# Patient Record
Sex: MALE | Race: WHITE | NOT HISPANIC OR LATINO | Employment: FULL TIME | URBAN - METROPOLITAN AREA
[De-identification: names, ages, dates, MRNs, and addresses within clinical notes are randomized per-mention and may not be internally consistent; named-entity substitution may affect disease eponyms.]

---

## 2017-05-18 ENCOUNTER — TELEPHONE (OUTPATIENT)
Dept: MEDICAL GROUP | Facility: CLINIC | Age: 37
End: 2017-05-18

## 2017-07-10 ENCOUNTER — OCCUPATIONAL MEDICINE (OUTPATIENT)
Dept: URGENT CARE | Facility: PHYSICIAN GROUP | Age: 37
End: 2017-07-10
Payer: COMMERCIAL

## 2017-07-10 ENCOUNTER — HOSPITAL ENCOUNTER (OUTPATIENT)
Dept: RADIOLOGY | Facility: MEDICAL CENTER | Age: 37
End: 2017-07-10
Attending: NURSE PRACTITIONER
Payer: COMMERCIAL

## 2017-07-10 VITALS
BODY MASS INDEX: 30.8 KG/M2 | WEIGHT: 220 LBS | TEMPERATURE: 97.9 F | HEIGHT: 71 IN | SYSTOLIC BLOOD PRESSURE: 112 MMHG | DIASTOLIC BLOOD PRESSURE: 76 MMHG | HEART RATE: 50 BPM | OXYGEN SATURATION: 96 %

## 2017-07-10 DIAGNOSIS — Z23 NEED FOR TDAP VACCINATION: ICD-10-CM

## 2017-07-10 DIAGNOSIS — S61.210A LACERATION OF RIGHT INDEX FINGER: ICD-10-CM

## 2017-07-10 DIAGNOSIS — Z02.1 PRE-EMPLOYMENT DRUG SCREENING: ICD-10-CM

## 2017-07-10 LAB
AMP AMPHETAMINE: NORMAL
COC COCAINE: NORMAL
INT CON NEG: NEGATIVE
INT CON POS: POSITIVE
MET METHAMPHETAMINES: NORMAL
OPI OPIATES: NORMAL
PCP PHENCYCLIDINE: NORMAL
POC DRUG COMMENT 753798-OCCUPATIONAL HEALTH: NEGATIVE
THC: NORMAL

## 2017-07-10 PROCEDURE — 73140 X-RAY EXAM OF FINGER(S): CPT | Mod: RT

## 2017-07-10 PROCEDURE — 90471 IMMUNIZATION ADMIN: CPT | Performed by: NURSE PRACTITIONER

## 2017-07-10 PROCEDURE — 12001 RPR S/N/AX/GEN/TRNK 2.5CM/<: CPT | Mod: 29 | Performed by: NURSE PRACTITIONER

## 2017-07-10 PROCEDURE — 80305 DRUG TEST PRSMV DIR OPT OBS: CPT | Mod: 29 | Performed by: NURSE PRACTITIONER

## 2017-07-10 PROCEDURE — 90715 TDAP VACCINE 7 YRS/> IM: CPT | Performed by: NURSE PRACTITIONER

## 2017-07-10 RX ORDER — BUPROPION HYDROCHLORIDE 100 MG/1
100 TABLET ORAL 2 TIMES DAILY
COMMUNITY

## 2017-07-10 ASSESSMENT — ENCOUNTER SYMPTOMS
CARDIOVASCULAR NEGATIVE: 1
NAUSEA: 0
FALLS: 0
FOCAL WEAKNESS: 0
TINGLING: 0
SENSORY CHANGE: 0
CONSTITUTIONAL NEGATIVE: 1
NEUROLOGICAL NEGATIVE: 1
RESPIRATORY NEGATIVE: 1
GASTROINTESTINAL NEGATIVE: 1
PSYCHIATRIC NEGATIVE: 1

## 2017-07-10 NOTE — LETTER
"EMPLOYEE’S CLAIM FOR COMPENSATION/ REPORT OF INITIAL TREATMENT  FORM C-4    EMPLOYEE’S CLAIM - PROVIDE ALL INFORMATION REQUESTED   First Name  Vipul Last Name  Isaias Birthdate                    1980                Sex  male Claim Number   Home Address  [place] Age  36 y.o. Height  1.803 m (5' 11\") Weight  99.791 kg (220 lb) [facility]     [facility] Zip  [address] Telephone  [phone] (home)    Mailing Address  [place] [facility] Zip  [address] Primary Language Spoken  English    Insurer   Third Party   [employer]   Employee's Occupation (Job Title) When Injury or Occupational Disease Occurred      Employer's Name    [employer]      Employer Address   [address] [place] [address] Zip   [address]   Date of Injury  7/10/2017               Hour of Injury  7:50 AM Date Employer Notified  7/10/2017 Last Day of Work after Injury or Occupational Disease  7/10/2017 Supervisor to Whom Injury Reported  [facility]   Address or Location of Accident (if applicable)  [[address] [place] [address]]   What were you doing at the time of accident? (if applicable)  CUTTING ON THE SAW    How did this injury or occupational disease occur? (Be specific an answer in detail. Use additional sheet if necessary)  HOLDING THE STAIMLESS STEEL TUBING END   If you believe that you have an occupational disease, when did you first have knowledge of the disability and it relationship to your employment?   Witnesses to the Accident  none      Nature of Injury or Occupational Disease  Laceration  Part(s) of Body Injured or Affected  Finger (R), ,     I certify that the above is true and correct to the best of my knowledge and that I have provided this information in order to obtain the benefits of [place] inclusive or " [place]).  I hereby authorize any physician, chiropractor, surgeon, practitioner, or other person, any hospital, including [facility], any medical service organization, any insurance company, or other institution or organization to release to each other, any medical or other information, including benefits paid or payable, pertinent to this injury or disease, except information relative to diagnosis, treatment and/or counseling for AIDS, psychological conditions, alcohol or controlled substances, for which I must give specific authorization.  A Photostat of this authorization shall be as valid as the original.     Date   Place   Employee’s Signature   THIS REPORT MUST BE COMPLETED AND MAILED WITHIN 3 WORKING DAYS OF TREATMENT   Place  [facility]  Name of Facility  [place]   Date  7/10/2017 Diagnosis  (S61.210A) Laceration of right index finger  (Z23) Need for Tdap vaccination  (Z02.1) Pre-employment drug screening Is there evidence the injured employee was under the influence of alcohol and/or another controlled substance at the time of accident?   Hour   Description of Injury or Disease  Diagnoses of Laceration of right index finger, Need for Tdap vaccination, and Pre-employment drug screening were pertinent to this visit. No   Treatment  Sutures placed, TDAP given, wound care, work restrictions, RTC 7/14/17 for re-eval.   Have you advised the patient to remain off work five days or more? No   X-Ray Findings  Negative   If Yes   From Date  To Date      From information given by the employee, together with medical evidence, can you directly connect this injury or occupational disease as job incurred?  Yes If No Full Duty  No Modified Duty  Yes   Is additional medical care by a physician indicated?  Yes If Modified Duty, Specify any Limitations / Restrictions  Per D39   Do you know of any previous injury or disease contributing to this condition or  "occupational disease?                            No   Date  7/10/2017 Print Doctor’s Name ZHENG Pearson I certify the employer’s copy of  this form was mailed on:   Address  [address] Insurer’s Use Only     [facility] Zip  [address]    Provider’s Tax ID Number  [id number]  Telephone  Dept: [phone]        alejandro-JIMMY Baxter   e-Signature: Dr. Tio Vega, Medical Director Degree  APRN        ORIGINAL-TREATING PHYSICIAN OR CHIROPRACTOR    PAGE 2-INSURER/TPA    PAGE 3-EMPLOYER    PAGE 4-EMPLOYEE             Form C-4 (rev10/07)              BRIEF DESCRIPTION OF RIGHTS AND BENEFITS  (Pursuant to [place]RS 616C.050)    Notice of Injury or Occupational Disease (Incident Report Form C-1): If an injury or occupational disease (OD) arises out of and in the  course of employment, you must provide written notice to your employer as soon as practicable, but no later than 7 days after the accident or  OD. Your employer shall maintain a sufficient supply of the required forms.    Claim for Compensation (Form C-4): If medical treatment is sought, the form C-4 is available at the place of initial treatment. A completed  \"Claim for Compensation\" (Form C-4) must be filed within 90 days after an accident or OD. The treating physician or chiropractor must,  within 3 working days after treatment, complete and mail to the employer, the employer's insurer and third-party , the Claim for  Compensation.    Medical Treatment: If you require medical treatment for your on-the-job injury or OD, you may be required to select a physician or  chiropractor from a list provided by your workers’ compensation insurer, if it has contracted with an Organization for Managed Care (MCO) or  Preferred Provider Organization (PPO) or providers of health care. If your employer has not entered into a contract with an MCO or PPO, you  may select a physician or chiropractor from the Panel of Physicians and " Chiropractors. Any medical costs related to your industrial injury or  OD will be paid by your insurer.    Temporary Total Disability (TTD): If your doctor has certified that you are unable to work for a period of at least 5 consecutive days, or 5  cumulative days in a 20-day period, or places restrictions on you that your employer does not accommodate, you may be entitled to TTD  compensation.    Temporary Partial Disability (TPD): If the wage you receive upon reemployment is less than the compensation for TTD to which you are  entitled, the insurer may be required to pay you TPD compensation to make up the difference. TPD can only be paid for a maximum of 24  months.    Permanent Partial Disability (PPD): When your medical condition is stable and there is an indication of a PPD as a result of your injury or  OD, within 30 days, your insurer must arrange for an evaluation by a rating physician or chiropractor to determine the degree of your PPD. The  amount of your PPD award depends on the date of injury, the results of the PPD evaluation and your age and wage.    Permanent Total Disability (PTD): If you are medically certified by a treating physician or chiropractor as permanently and totally disabled  and have been granted a PTD status by your insurer, you are entitled to receive monthly benefits not to exceed 66 2/3% of your average  monthly wage. The amount of your PTD payments is subject to reduction if you previously received a PPD award.    Vocational Rehabilitation Services: You may be eligible for vocational rehabilitation services if you are unable to return to the job due to a  permanent physical impairment or permanent restrictions as a result of your injury or occupational disease.    Transportation and Per Elliot Reimbursement: You may be eligible for travel expenses and per elliot associated with medical treatment.    Reopening: You may be able to reopen your claim if your condition worsens after claim  closure.    Appeal Process: If you disagree with a written determination issued by the insurer or the insurer does not respond to your request, you may  appeal to the Department of Administration, , by following the instructions contained in your determination letter. You must  appeal the determination within 70 days from the date of the determination letter at [address], [address], [place], [place]  [address], or [address], [address], [place], [place] [address]. If you disagree with the  decision, you may appeal to the  Department of Administration, . You must file your appeal within 30 days from the date of the  decision  letter at [address], [address], [place], [place] [address], or [address], [place], [place] [address]. If you  disagree with a decision of an , you may file a petition for judicial review with the District Court. You must do so within 30  days of the Appeal Officer’s decision. You may be represented by an  at your own expense or you may contact the [facility] for possible  representation.    [place]  for Injured Workers (NAIW): If you disagree with a  decision, you may request that NAIW represent you  without charge at an  Hearing. For information regarding denial of benefits, you may contact the [facility] at: [address], [address], [place] [address], [phone], or [address], [place] [address], [phone]    To File a Complaint with the Division: If you wish to file a complaint with the  of the Division of Industrial Relations (DIR),  please contact the Workers’ Compensation Section, [address], [address], [place], [place] [address], telephone [phone]) [phone], or  [address], [place] [address], telephone [phone]) [phone].    For  assistance with Workers’ Compensation Issues: you may contact the Office of the Governor Consumer Health Assistance, [address] [place] [address], [place] [address]60, [place] [address], Toll Free 1-381.831.9955, Web site: http://govcha.Critical access hospital.nv., E-mail  [email or web address] access [email or web address].                                                                                                                                                                                                                                   __________________________________________________________________                                                                   _________________                Employee Name / Signature                                                                                                                                                       Date                                                                                                                                                                                                     D-2 (rev. 10/07)

## 2017-07-10 NOTE — PATIENT INSTRUCTIONS
Vipul Esparza is a 36 y.o. male here for a non-provider visit for uds    If abnormal was an in office provider notified today (if so, indicate provider)? No  Routed to PCP? No

## 2017-07-10 NOTE — PROGRESS NOTES
"Subjective:      Vipul Esparza is a 36 y.o. male who presents with Laceration      DOI 7/10/17 0750: Patient was cutting a metal pipe using a saw when the piece of pipe broke and accidentally laceration the tip of his right index finger. He immediately developed pain to the site. Pain is rated 8/10. He denies weakness, numbness, or tingling. He wrapped the finger up and came directly to urgent care. He cannot recall his last tetanus booster. He is left hand dominate. He denies previous injuries to this finger.      Laceration     DOI 7/10/17 0750: Patient was cutting a metal pipe using a saw when the piece of pipe broke and accidentally laceration the tip of his right index finger. He immediately developed pain to the site. Pain is rated 8/10. He denies weakness, numbness, or tingling. He wrapped the finger up and came directly to urgent care. He cannot recall his last tetanus booster. He is left hand dominate. He denies previous injuries to this finger.     Past Medical History   Diagnosis Date   • Diabetes      possible   • ASTHMA    • Dental disorder      broken teeth lower front   • Pain      jaw   • Psychiatric problem      anxiety   • Seizure ([facility]) 9/2010     \"during sleep\"     Past Surgical History   Procedure Laterality Date   • Mandible fracture orif  10/7/2010     Performed by [facility]ELAYA at [facility]     Current Outpatient Prescriptions on File Prior to Visit   Medication Sig Dispense Refill   • divalproex (DEPAKOTE) 500 MG TBEC Take 500 mg by mouth 3 times a day. 500mg in AM  1000mg in PM     • olanzapine (ZYPREXA) 10 MG tablet Take 10 mg by mouth every evening.     • hydroxyzine (VISTARIL) 50 MG CAPS Take 50 mg by mouth every bedtime.       No current facility-administered medications on file prior to visit.     Review of patient's allergies indicates no known allergies.      Review of Systems   Constitutional: Negative.    Respiratory: Negative.    Cardiovascular: Negative.  " "  Gastrointestinal: Negative.  Negative for nausea.   Musculoskeletal: Negative for joint pain and falls.        Digit pain   Skin: Negative for itching and rash.        Laceration to fingertip   Neurological: Negative.  Negative for tingling, sensory change and focal weakness.   Endo/Heme/Allergies: Negative.    Psychiatric/Behavioral: Negative.           Objective:     /76 mmHg  Pulse 50  Temp(Src) 36.6 °C (97.9 °F)  Ht 1.803 m (5' 11\")  Wt 99.791 kg (220 lb)  BMI 30.70 kg/m2  SpO2 96%     Physical Exam   Constitutional: He is oriented to person, place, and time. Vital signs are normal. He appears well-developed and well-nourished. He does not appear ill. No distress.   HENT:   Head: Normocephalic and atraumatic.   Right Ear: External ear normal.   Left Ear: External ear normal.   Nose: Nose normal.   Mouth/Throat: Oropharynx is clear and moist.   Eyes: Conjunctivae are normal. Pupils are equal, round, and reactive to light. Right eye exhibits no discharge. Left eye exhibits no discharge.   Neck: Normal range of motion. Neck supple.   Cardiovascular: Normal rate, regular rhythm, normal heart sounds and intact distal pulses.    Pulmonary/Chest: Effort normal and breath sounds normal. No respiratory distress.   Musculoskeletal: Normal range of motion. He exhibits tenderness. He exhibits no edema.        Right hand: He exhibits tenderness and laceration. He exhibits normal range of motion, no bony tenderness, normal two-point discrimination, normal capillary refill, no deformity and no swelling. Normal sensation noted. Normal strength noted.        Hands:  Lymphadenopathy:     He has no cervical adenopathy.   Neurological: He is alert and oriented to person, place, and time. He has normal strength. No cranial nerve deficit or sensory deficit. He exhibits normal muscle tone. Coordination and gait normal.   Skin: Skin is warm and dry. Laceration noted. No abrasion, no bruising, no burn, no ecchymosis and no " "rash noted. He is not diaphoretic. No cyanosis or erythema. No pallor. Nails show no clubbing.   There is a linear, horizontal 1.5cm laceration to distal tip of right index finger, palmar aspect, no nailbed involvement. No foreign bodies identified. N/V intact. Cap refill brisk. Full ROM.    Psychiatric: He has a normal mood and affect. His behavior is normal. Judgment and thought content normal.   Vitals reviewed.         Assessment/Plan:     1. Laceration of right index finger  DX-FINGER(S) 2+ RIGHT    Tdap =>6yo IM   2. Need for Tdap vaccination  Tdap =>6yo IM   3. Pre-employment drug screening  POCT 6 Panel Urine Drug Screen       DX finger reviewed by myself, radiology reading \"No evidence for fracture or dislocation. Laceration injury to soft tissues of the distal portion of the right index finger.\"        Procedure: Laceration Repair  -Risks including bleeding, nerve damage, infection, and poor cosmetic outcome discussed at length. Benefits and alternatives discussed.   -Sterile technique throughout  -Digital block and minimal local anesthesia with 2% lidocaine  -Closed with #5  4-0 Nylon interrupted sutures with good wound approximation  -Dressing placed  -Patient tolerated well      TDAP given, wound care discussed, work restrictions, RTC 7/14/17 for re-eval.   Supportive care, differential diagnoses, and indications for immediate follow-up discussed with patient.   Pathogenesis of diagnosis discussed including typical length and natural progression.   Instructed to return to clinic or nearest emergency department sooner for any change in condition, further concerns, or worsening of symptoms.  Patient states understanding of the plan of care and discharge instructions.          ELLA Pearson.        "

## 2017-07-10 NOTE — LETTER
"   [facility]   [address] [place] [address]  Phone: [phone] - Fax: [phone]        [facility] Progress Report and Disability Certification  Date of Service: 7/10/2017   No Show:  No  Date / Time of Next Visit: 7/14/2017   Claim Information   Patient Name: Vipul Esparza  Claim Number:     Employer:   [employer] Date of Injury: 7/10/2017     Insurer / TPA: [employer]ia [employer]  ID / SSN:     Occupation:   Diagnosis: Diagnoses of Laceration of right index finger, Need for Tdap vaccination, and Pre-employment drug screening were pertinent to this visit.    Medical Information   Related to Industrial Injury? Yes    Subjective Complaints:  DOI 7/10/17 0750: Patient was cutting a metal pipe using a saw when the piece of pipe broke and accidentally laceration the tip of his right index finger. He immediately developed pain to the site. Pain is rated 8/10. He denies weakness, numbness, or tingling. He wrapped the finger up and came directly to urgent care. He cannot recall his last tetanus booster. He is left hand dominate. He denies previous injuries to this finger.    Objective Findings: A/Ox4. NAD. There is a linear, horizontal 1.5cm laceration to distal tip of right index finger, palmar aspect, no nailbed involvement. No foreign bodies identified. N/V intact. Cap refill brisk. Full ROM.    Pre-Existing Condition(s): Denies    Assessment:   Initial Visit    Status: Additional Care Required  Permanent Disability:No    Plan:   Comments:Sutures placed, TDAP given, wound care, work restrictions, RTC 7/14/17 for re-eval.     Diagnostics: X-ray    Comments:  DX finger radiology reading \"No evidence for fracture or dislocation. Laceration injury to soft tissues of the distal portion of the right index finger.\"        Disability Information   Status: Released to Restricted Duty    From:  7/10/2017  Through: 7/14/2017 Restrictions are: Temporary   "   Physical Restrictions   Sitting:    Standing:    Stooping:    Bending:      Squatting:    Walking:    Climbing:    Pushing:      Pulling:    Other:    Reaching Above Shoulder (L):   Reaching Above Shoulder (R):       Reaching Below Shoulder (L):    Reaching Below Shoulder (R):      Not to exceed Weight Limits   Carrying(hrs):   Weight Limit(lb):   Lifting(hrs):   Weight  Limit(lb):     Comments: Must keep finger bandaged while at work. Instructed to refrain from submerging finger into water or liquid.     Repetitive Actions   Hands: i.e. Fine Manipulations from Grasping: < or = to 1 hr/day  Comments:right hand   Feet: i.e. Operating Foot Controls:     Driving / Operate Machinery:     Physician Name: ZHENG Pearson Physician Signature: JIMMY Suarez e-Signature: Dr. Tio Vega, Medical Director   Clinic Name / Location: [address] [address] Clinic Phone Number: Dept: [phone]   Appointment Time: 8:15 Am Visit Start Time:    Check-In Time:  8:30 Am Visit Discharge Time:     Original-Treating Physician or Chiropractor    Page 2-Insurer/TPA    Page 3-Employer    Page 4-Employee

## 2017-07-14 ENCOUNTER — OCCUPATIONAL MEDICINE (OUTPATIENT)
Dept: URGENT CARE | Facility: PHYSICIAN GROUP | Age: 37
End: 2017-07-14
Payer: COMMERCIAL

## 2017-07-14 VITALS
OXYGEN SATURATION: 95 % | SYSTOLIC BLOOD PRESSURE: 118 MMHG | DIASTOLIC BLOOD PRESSURE: 64 MMHG | BODY MASS INDEX: 30.14 KG/M2 | HEART RATE: 60 BPM | TEMPERATURE: 97.2 F | WEIGHT: 216 LBS

## 2017-07-14 DIAGNOSIS — S61.218D: ICD-10-CM

## 2017-07-14 PROCEDURE — 99024 POSTOP FOLLOW-UP VISIT: CPT | Performed by: PHYSICIAN ASSISTANT

## 2017-07-14 ASSESSMENT — ENCOUNTER SYMPTOMS
FOCAL WEAKNESS: 0
BRUISES/BLEEDS EASILY: 0
ROS SKIN COMMENTS: SEE HPI
CONSTITUTIONAL NEGATIVE: 1
TINGLING: 0

## 2017-07-14 NOTE — MR AVS SNAPSHOT
Vipul Esparza   2017 3:30 PM   Occupational Medicine   MRN: [id number]    Department:  [facility]   Dept Phone:  [phone]    Description:  Male : 1980   Provider:  Taryn Cortes PA-C           Reason for Visit     Follow-Up fv sutures      Allergies as of 2017     No Known Allergies      You were diagnosed with     Laceration of index finger, subsequent encounter   [[id number]]         Vital Signs     Blood Pressure Pulse Temperature Weight Oxygen Saturation       118/64 mmHg 60 36.2 °C (97.2 °F) 97.977 kg (216 lb) 95%       Basic Information     Date Of Birth Sex Race Ethnicity Preferred Language    1980 Male White Non- English      Your appointments     2017  3:30 PM   Urgent/Same Day with [facility]   [facility] [place]    [address] [place] [id number]   [phone]           You will be receiving a confirmation call a few days before your appointment from our automated call confirmation system.              Health Maintenance        Date Due Completion Dates    IMM INFLUENZA (1) 2017 ---    IMM DTaP/Tdap/Td Vaccine (2 - Td) 7/10/2027 7/10/2017            Current Immunizations     Tdap Vaccine 7/10/2017      Below and/or attached are the medications your provider expects you to take. Review all of your home medications and newly ordered medications with your provider and/or pharmacist. Follow medication instructions as directed by your provider and/or pharmacist. Please keep your medication list with you and share with your provider. Update the information when medications are discontinued, doses are changed, or new medications (including over-the-counter products) are added; and carry medication information at all times in the event of emergency situations     Allergies:  No Known Allergies          Medications  Valid as of: 2017 -  3:33 PM    Generic Name Brand Name Tablet Size Instructions for use    BuPROPion HCl  (Tab) WELLBUTRIN 100 MG Take 100 mg by mouth 2 times a day.        Divalproex Sodium (Tablet Delayed Response) DEPAKOTE 500 MG Take 500 mg by mouth 3 times a day. 500mg in AM  1000mg in PM        HydrOXYzine Pamoate (Cap) VISTARIL 50 MG Take 50 mg by mouth every bedtime.        OLANZapine (Tab) ZYPREXA 10 MG Take 10 mg by mouth every evening.        .                 Medicines prescribed today were sent to:     [facility] [address] [place] [address] - [address] [place] [address]    Phone: [phone] Fax: [phone]    Open 24 Hours?: No      Medication refill instructions:       If your prescription bottle indicates you have medication refills left, it is not necessary to call your provider’s office. Please contact your pharmacy and they will refill your medication.    If your prescription bottle indicates you do not have any refills left, you may request refills at any time through one of the following ways: The online [employer] system (except Urgent Care), by calling your provider’s office, or by asking your pharmacy to contact your provider’s office with a refill request. Medication refills are processed only during regular business hours and may not be available until the next business day. Your provider may request additional information or to have a follow-up visit with you prior to refilling your medication.   *Please Note: Medication refills are assigned a new Rx number when refilled electronically. Your pharmacy may indicate that no refills were authorized even though a new prescription for the same medication is available at the pharmacy. Please request the medicine by name with the pharmacy before contacting your provider for a refill.           MyChart Status: Patient Declined        Quit Tobacco Information     Do you want to quit using tobacco?    Quitting tobacco decreases risks of cancer, heart and lung disease, increases life expectancy, improves sense of taste and smell, and increases  spending money, among other benefits.    If you are thinking about quitting, we can help.  • [facility] Quit Tobacco Program: 339.547.7901  o Program occurs weekly for four weeks and includes pharmacist consultation on products to support quitting smoking or chewing tobacco. A provider referral is needed for pharmacist consultation.  • Tobacco Users Help Hotline: 2-180-QUIT-NOW (205-7714) or https://[place].quitlogix.org/  o Free, confidential telephone and online coaching for [place] residents. Sessions are designed on a schedule that is convenient for you. Eligible clients receive free nicotine replacement therapy.  • Nationally: www.smokefree.gov  o Information and professional assistance to support both immediate and long-term needs as you become, and remain, a non-smoker. Smokefree.gov allows you to choose the help that best fits your needs.

## 2017-07-14 NOTE — LETTER
[facility] [place] [address]  Phone: [phone] - Fax: [phone]        [facility] Progress Report and Disability Certification  Date of Service: 7/14/2017   No Show:  No  Date / Time of Next Visit: 7/20/2017   Claim Information   Patient Name: Vipul Esparza  Claim Number:     Employer:   [employer] Date of Injury: 7/10/2017     Insurer / TPA: [employer]  ID / SSN:     Occupation:   Diagnosis: The encounter diagnosis was Laceration of index finger, subsequent encounter.    Medical Information   Related to Industrial Injury? Yes    Subjective Complaints:  DOI 7/10/17 0750: Patient was cutting a metal pipe using a saw when the piece of pipe broke and accidentally laceration the tip of his right index finger.  Repaired in clinic.  Had quite a bit of pain that day but has since improved.   He denies any pain today.  He states that he feels he has normal sensation. No tingling.  No difficulty with ROM.  No redness, swelling or discharge.       Objective Findings: Healing 1.5cm laceration to distal tip of right index finger, palmar aspect. N/V intact. Cap refill brisk. Full ROM.   Deep layers appear to be closed.     Pre-Existing Condition(s): None    Assessment:   Condition Improved    Status: Additional Care Required  Permanent Disability:No    Plan:      Diagnostics: X-ray  Comments:NEG 07/10/17    Comments:       Disability Information   Status: Released to Full Duty    From:  7/14/2017  Through: 7/20/2017 Restrictions are:     Physical Restrictions   Sitting:    Standing:    Stooping:    Bending:      Squatting:    Walking:    Climbing:    Pushing:      Pulling:    Other:    Reaching Above Shoulder (L):   Reaching Above Shoulder (R):       Reaching Below Shoulder (L):    Reaching Below Shoulder (R):      Not to exceed Weight Limits   Carrying(hrs):   Weight Limit(lb):   Lifting(hrs):   Weight  Limit(lb):     Comments: Healing  well.  No signs or symptoms of infection  Full duty however keep covered at work.   RTC next week for suture removal and likely MMI    Repetitive Actions   Hands: i.e. Fine Manipulations from Grasping:     Feet: i.e. Operating Foot Controls:     Driving / Operate Machinery:     Physician Name: Rosa Cortes PA-C Physician Signature: ROSA Edwards PA-C e-Signature: Dr. Tio Vega, Medical Director   Clinic Name / Location: [address] [address] Clinic Phone Number: Dept: [phone]   Appointment Time: 3:30 Pm Visit Start Time: 3:11 PM   Check-In Time:  2:40 Pm Visit Discharge Time:  3:31 PM   Original-Treating Physician or Chiropractor    Page 2-Insurer/TPA    Page 3-Employer    Page 4-Employee

## 2017-07-14 NOTE — PROGRESS NOTES
Subjective:      Vipul Esparza is a 36 y.o. male who presents with Follow-Up      DOI 7/10/17 0750: Patient was cutting a metal pipe using a saw when the piece of pipe broke and accidentally laceration the tip of his right index finger.  Repaired in clinic.  Had quite a bit of pain that day but has since improved.   He denies any pain today.  He states that he feels he has normal sensation. No tingling.  No difficulty with ROM.  No redness, swelling or discharge.         HPI  As above.     Review of Systems   Constitutional: Negative.    Skin:        SEE HPI   Neurological: Negative for tingling and focal weakness.   Endo/Heme/Allergies: Does not bruise/bleed easily.       PMH:  has a past medical history of Diabetes; ASTHMA; Dental disorder; Pain; Psychiatric problem; and Seizure (CMS-[facility]) (9/2010).  MEDS:   Current outpatient prescriptions:   •  buPROPion (WELLBUTRIN) 100 MG Tab, Take 100 mg by mouth 2 times a day., Disp: , Rfl:   •  divalproex (DEPAKOTE) 500 MG TBEC, Take 500 mg by mouth 3 times a day. 500mg in AM 1000mg in PM, Disp: , Rfl:   •  olanzapine (ZYPREXA) 10 MG tablet, Take 10 mg by mouth every evening., Disp: , Rfl:   •  hydroxyzine (VISTARIL) 50 MG CAPS, Take 50 mg by mouth every bedtime., Disp: , Rfl:   ALLERGIES: No Known Allergies  SURGHX:   Past Surgical History   Procedure Laterality Date   • Mandible fracture orif  10/7/2010     Performed by [facility] at [facility]     SOCHX:  reports that he has been smoking Cigarettes.  He has a 5 pack-year smoking history. He does not have any smokeless tobacco history on file. He reports that he does not drink alcohol or use illicit drugs.  FH: Family history was reviewed, no pertinent findings to report     Objective:     /64 mmHg  Pulse 60  Temp(Src) 36.2 °C (97.2 °F)  Wt 97.977 kg (216 lb)  SpO2 95%     Physical Exam   Constitutional: He is oriented to person, place, and time. He appears well-developed and well-nourished.    Cardiovascular: Normal rate.    Pulmonary/Chest: Effort normal.   Neurological: He is alert and oriented to person, place, and time.   Skin: Skin is warm and dry. No rash noted.   Psychiatric: He has a normal mood and affect. His behavior is normal.   Vitals reviewed.      Healing 1.5cm laceration to distal tip of right index finger, palmar aspect. N/V intact. Cap refill brisk. Full ROM.   Deep layers appear to be closed.         Assessment/Plan:     1. Laceration of index finger, subsequent encounter         Healing well.  No signs or symptoms of infection  Full duty however keep covered at work.   RTC next week for suture removal and likely MMI    Taryn Cortes PA-C

## 2017-07-20 ENCOUNTER — OFFICE VISIT (OUTPATIENT)
Dept: URGENT CARE | Facility: PHYSICIAN GROUP | Age: 37
End: 2017-07-20

## 2017-07-20 VITALS
HEIGHT: 71 IN | WEIGHT: 216 LBS | TEMPERATURE: 97.9 F | HEART RATE: 69 BPM | OXYGEN SATURATION: 99 % | DIASTOLIC BLOOD PRESSURE: 74 MMHG | BODY MASS INDEX: 30.24 KG/M2 | SYSTOLIC BLOOD PRESSURE: 118 MMHG

## 2017-07-20 DIAGNOSIS — S61.218D: ICD-10-CM

## 2017-07-20 NOTE — PATIENT INSTRUCTIONS
Laceration Care, Adult  A laceration is a cut that goes through all of the layers of the skin and into the tissue that is right under the skin. Some lacerations heal on their own. Others need to be closed with stitches (sutures), staples, skin adhesive strips, or skin glue. Proper laceration care minimizes the risk of infection and helps the laceration to heal better.  HOW TO CARE FOR YOUR LACERATION  If sutures or staples were used:  · Keep the wound clean and dry.  · If you were given a bandage (dressing), you should change it at least one time per day or as told by your health care provider. You should also change it if it becomes wet or dirty.  · Keep the wound completely dry for the first 24 hours or as told by your health care provider. After that time, you may shower or bathe. However, make sure that the wound is not soaked in water until after the sutures or staples have been removed.  · Clean the wound one time each day or as told by your health care provider:  ¨ Wash the wound with soap and water.  ¨ Rinse the wound with water to remove all soap.  ¨ Pat the wound dry with a clean towel. Do not rub the wound.  · After cleaning the wound, apply a thin layer of antibiotic ointment as told by your health care provider. This will help to prevent infection and keep the dressing from sticking to the wound.  · Have the sutures or staples removed as told by your health care provider.  If skin adhesive strips were used:  · Keep the wound clean and dry.  · If you were given a bandage (dressing), you should change it at least one time per day or as told by your health care provider. You should also change it if it becomes dirty or wet.  · Do not get the skin adhesive strips wet. You may shower or bathe, but be careful to keep the wound dry.  · If the wound gets wet, pat it dry with a clean towel. Do not rub the wound.  · Skin adhesive strips fall off on their own. You may trim the strips as the wound heals. Do not  remove skin adhesive strips that are still stuck to the wound. They will fall off in time.  If skin glue was used:  · Try to keep the wound dry, but you may briefly wet it in the shower or bath. Do not soak the wound in water, such as by swimming.  · After you have showered or bathed, gently pat the wound dry with a clean towel. Do not rub the wound.  · Do not do any activities that will make you sweat heavily until the skin glue has fallen off on its own.  · Do not apply liquid, cream, or ointment medicine to the wound while the skin glue is in place. Using those may loosen the film before the wound has healed.  · If you were given a bandage (dressing), you should change it at least one time per day or as told by your health care provider. You should also change it if it becomes dirty or wet.  · If a dressing is placed over the wound, be careful not to apply tape directly over the skin glue. Doing that may cause the glue to be pulled off before the wound has healed.  · Do not pick at the glue. The skin glue usually remains in place for 5-10 days, then it falls off of the skin.  General Instructions  · Take over-the-counter and prescription medicines only as told by your health care provider.  · If you were prescribed an antibiotic medicine or ointment, take or apply it as told by your doctor. Do not stop using it even if your condition improves.  · To help prevent scarring, make sure to cover your wound with sunscreen whenever you are outside after stitches are removed, after adhesive strips are removed, or when glue remains in place and the wound is healed. Make sure to wear a sunscreen of at least 30 SPF.  · Do not scratch or pick at the wound.  · Keep all follow-up visits as told by your health care provider. This is important.  · Check your wound every day for signs of infection. Watch for:  ¨ Redness, swelling, or pain.  ¨ Fluid, blood, or pus.  · Raise (elevate) the injured area above the level of your heart  while you are sitting or lying down, if possible.  SEEK MEDICAL CARE IF:  · You received a tetanus shot and you have swelling, severe pain, redness, or bleeding at the injection site.  · You have a fever.  · A wound that was closed breaks open.  · You notice a bad smell coming from your wound or your dressing.  · You notice something coming out of the wound, such as wood or glass.  · Your pain is not controlled with medicine.  · You have increased redness, swelling, or pain at the site of your wound.  · You have fluid, blood, or pus coming from your wound.  · You notice a change in the color of your skin near your wound.  · You need to change the dressing frequently due to fluid, blood, or pus draining from the wound.  · You develop a new rash.  · You develop numbness around the wound.  SEEK IMMEDIATE MEDICAL CARE IF:  · You develop severe swelling around the wound.  · Your pain suddenly increases and is severe.  · You develop painful lumps near the wound or on skin that is anywhere on your body.  · You have a red streak going away from your wound.  · The wound is on your hand or foot and you cannot properly move a finger or toe.  · The wound is on your hand or foot and you notice that your fingers or toes look pale or bluish.     This information is not intended to replace advice given to you by your health care provider. Make sure you discuss any questions you have with your health care provider.     Document Released: 12/18/2006 Document Revised: 05/03/2016 Document Reviewed: 12/14/2015  [employer] Interactive Patient Education ©2016 [employer]

## 2017-07-24 ENCOUNTER — OCCUPATIONAL MEDICINE (OUTPATIENT)
Dept: OCCUPATIONAL MEDICINE | Facility: CLINIC | Age: 37
End: 2017-07-24
Payer: COMMERCIAL

## 2017-07-24 VITALS
HEIGHT: 71 IN | RESPIRATION RATE: 16 BRPM | DIASTOLIC BLOOD PRESSURE: 86 MMHG | SYSTOLIC BLOOD PRESSURE: 114 MMHG | BODY MASS INDEX: 30.24 KG/M2 | WEIGHT: 216 LBS | HEART RATE: 78 BPM | OXYGEN SATURATION: 98 % | TEMPERATURE: 97.2 F

## 2017-07-24 DIAGNOSIS — S61.210A LACERATION OF RIGHT INDEX FINGER W/O FOREIGN BODY W/O DAMAGE TO NAIL, INITIAL ENCOUNTER: ICD-10-CM

## 2017-07-24 PROCEDURE — 99202 OFFICE O/P NEW SF 15 MIN: CPT | Performed by: PREVENTIVE MEDICINE

## 2017-07-24 NOTE — MR AVS SNAPSHOT
"        Vipul Yooud   2017 3:30 PM   Occupational Medicine   MRN: [id number]    Department:  [facility]   Dept Phone:  [phone]    Description:  Male : 1980   Provider:  Glenn Lozano D.O.           Reason for Visit     Flu Vaccine Wc doi 7/10/17 Rt finger feeling better      Allergies as of 2017     No Known Allergies      You were diagnosed with     Laceration of right index finger w/o foreign body w/o damage to nail, initial encounter   [[id number]]         Vital Signs     Blood Pressure Pulse Temperature Respirations Height Weight    114/86 mmHg 78 36.2 °C (97.2 °F) 16 1.803 m (5' 10.98\") 97.977 kg (216 lb)    Body Mass Index Oxygen Saturation                30.14 kg/m2 98%          Basic Information     Date Of Birth Sex Race Ethnicity Preferred Language    1980 Male White Non- English      Health Maintenance        Date Due Completion Dates    IMM INFLUENZA (1) 2017 ---    IMM DTaP/Tdap/Td Vaccine (2 - Td) 7/10/2027 7/10/2017            Current Immunizations     Tdap Vaccine 7/10/2017      Below and/or attached are the medications your provider expects you to take. Review all of your home medications and newly ordered medications with your provider and/or pharmacist. Follow medication instructions as directed by your provider and/or pharmacist. Please keep your medication list with you and share with your provider. Update the information when medications are discontinued, doses are changed, or new medications (including over-the-counter products) are added; and carry medication information at all times in the event of emergency situations     Allergies:  No Known Allergies          Medications  Valid as of: 2017 -  3:54 PM    Generic Name Brand Name Tablet Size Instructions for use    BuPROPion HCl (Tab) WELLBUTRIN 100 MG Take 100 mg by mouth 2 times a day.        Divalproex Sodium (Tablet Delayed Response) DEPAKOTE 500 MG Take 500 mg by mouth 3 times a day. 500mg " in AM  1000mg in PM        HydrOXYzine Pamoate (Cap) VISTARIL 50 MG Take 50 mg by mouth every bedtime.        OLANZapine (Tab) ZYPREXA 10 MG Take 10 mg by mouth every evening.        .                 Medicines prescribed today were sent to:     [facility] [address] [facility] [place] - [address]    [address] [place] [address]    Phone: [phone] Fax: [phone]    Open 24 Hours?: No      Medication refill instructions:       If your prescription bottle indicates you have medication refills left, it is not necessary to call your provider’s office. Please contact your pharmacy and they will refill your medication.    If your prescription bottle indicates you do not have any refills left, you may request refills at any time through one of the following ways: The online [employer] system (except Urgent Care), by calling your provider’s office, or by asking your pharmacy to contact your provider’s office with a refill request. Medication refills are processed only during regular business hours and may not be available until the next business day. Your provider may request additional information or to have a follow-up visit with you prior to refilling your medication.   *Please Note: Medication refills are assigned a new Rx number when refilled electronically. Your pharmacy may indicate that no refills were authorized even though a new prescription for the same medication is available at the pharmacy. Please request the medicine by name with the pharmacy before contacting your provider for a refill.           MyChart Status: Patient Declined        Quit Tobacco Information     Do you want to quit using tobacco?    Quitting tobacco decreases risks of cancer, heart and lung disease, increases life expectancy, improves sense of taste and smell, and increases spending money, among other benefits.    If you are thinking about quitting, we can help.  • [facility] Quit Tobacco Program: 025-023-7328  o Program occurs weekly for four  weeks and includes pharmacist consultation on products to support quitting smoking or chewing tobacco. A provider referral is needed for pharmacist consultation.  • Tobacco Users Help Hotline: 8-570-QUIT-NOW (534-0586) or https://[place].quitlogix.org/  o Free, confidential telephone and online coaching for [place] residents. Sessions are designed on a schedule that is convenient for you. Eligible clients receive free nicotine replacement therapy.  • Nationally: www.smokefree.gov  o Information and professional assistance to support both immediate and long-term needs as you become, and remain, a non-smoker. Smokefree.gov allows you to choose the help that best fits your needs.

## 2017-07-24 NOTE — Clinical Note
"[address],   [address] [address]  Phone: [phone] - Fax: [phone]        [facility] Progress Report and Disability Certification  Date of Service: 7/24/2017   No Show:  No  Date / Time of Next Visit:  Release from care   Claim Information   Patient Name: Vipul Esparza  Claim Number:     Employer:   [employer]    Date of Injury: 7/10/2017     Insurer / TPA: [employer]  ID / SSN:     Occupation:   Diagnosis: The encounter diagnosis was Laceration of right index finger w/o foreign body w/o damage to nail, initial encounter.    Medical Information   Related to Industrial Injury? Yes    Subjective Complaints:  DOI 7/10/17: Patient was cutting a metal pipe using a saw when the piece of pipe broke and accidentally laceration the tip of his right index finger.  Repaired in UC. Patient states small. He states The wound flap is \"dead\". It started on  the first day and was \"mushy\" beginning but since then has hardend. Area has not increased in size. Denies any tenderness, numbness or tingling. Full range of motion digit. No concerns at this time   Objective Findings: Right Index Finger: Healing laceration of distal pad, 1.5cm. Wound edges well approximated. Non-tender. No erythema, swelling or signs of infection. Small area of necrotic tissue on wound flap. Full range of motion digit.    Sutures removed with small amount of dehisence   Pre-Existing Condition(s):     Assessment:   Condition Improved    Status: Discharged /  MMI  Permanent Disability:No    Plan:      Diagnostics:      Comments:  Wound should close and fully heal in the next few weeks. Small necrotic area will resolve on its own, not growing  Release from care   Full duty  Follow-up as needed    Disability Information   Status: Released to Full Duty    From:  7/24/2017  Through:   Restrictions are:     Physical Restrictions   Sitting:    Standing:  " Stooping:    Bending:      Squatting:    Walking:    Climbing:    Pushing:      Pulling:    Other:    Reaching Above Shoulder (L):   Reaching Above Shoulder (R):       Reaching Below Shoulder (L):    Reaching Below Shoulder (R):      Not to exceed Weight Limits   Carrying(hrs):   Weight Limit(lb):   Lifting(hrs):   Weight  Limit(lb):     Comments:      Repetitive Actions   Hands: i.e. Fine Manipulations from Grasping:     Feet: i.e. Operating Foot Controls:     Driving / Operate Machinery:     Physician Name: Glenn Lozano D.O. Physician Signature: GLENN Loomis D.O. e-Signature: Dr. Tio Vega, Medical Director   Clinic Name / Location: [address],   [address] [place] [address] Clinic Phone Number: Dept: [phone]   Appointment Time: 3:30 Pm Visit Start Time: 3:27 PM   Check-In Time:  3:20 Pm Visit Discharge Time:  @3:50PM    Original-Treating Physician or Chiropractor    Page 2-Insurer/TPA    Page 3-Employer    Page 4-Employee

## 2017-07-24 NOTE — PROGRESS NOTES
"Subjective:      Vipul Esparza is a 36 y.o. male who presents with Flu Vaccine      DOI 7/10/17: Patient was cutting a metal pipe using a saw when the piece of pipe broke and accidentally laceration the tip of his right index finger.  Repaired in . Patient states small. He states The wound flap is \"dead\". It started on  the first day and was \"mushy\" beginning but since then has hardend. Area has not increased in size. Denies any tenderness, numbness or tingling. Full range of motion digit. No concerns at this time     HPI    ROS  ROS: All systems were reviewed on intake form, form was reviewed and signed. See scanned documents in media. Pertinent positives and negatives included in HPI.    PMH: No pertinent past medical history to this problem  MEDS: Medications were reviewed in Epic  ALLERGIES: No Known Allergies  SOCHX: Works as   FH: No pertinent family history to this problem       Objective:     /86 mmHg  Pulse 78  Temp(Src) 36.2 °C (97.2 °F)  Resp 16  Ht 1.803 m (5' 10.98\")  Wt 97.977 kg (216 lb)  BMI 30.14 kg/m2  SpO2 98%     Physical Exam   Constitutional: He is oriented to person, place, and time. He appears well-developed and well-nourished.   HENT:   Right Ear: External ear normal.   Left Ear: External ear normal.   Cardiovascular: Normal rate.    Pulmonary/Chest: Effort normal.   Neurological: He is alert and oriented to person, place, and time.   Skin: Skin is warm and dry.   Psychiatric: He has a normal mood and affect. Judgment normal.       Right Index Finger: Healing laceration of distal pad, 1.5cm. Wound edges well approximated. Non-tender. No erythema, swelling or signs of infection. Small area of necrotic tissue on wound flap. Full range of motion digit.    Sutures removed with small amount of dehisence       Assessment/Plan:     1. Laceration of right index finger w/o foreign body w/o damage to nail, initial encounter  Wound should close and fully heal in the next few " weeks. Small necrotic area will resolve on its own, not growing   Release from care   Full duty   Follow-up as needed

## 2017-08-09 NOTE — PROGRESS NOTES
"Subjective:      Vipul Esparza is a 36 y.o. male who presents with Laceration            HPI Comments: Patient concerned with healing of laceration. Denies increase in pain, erythema, discharge, purulence. Endorses mild numbness to distal end of finger tip.    Laceration         ROS       Objective:     /74 mmHg  Pulse 69  Temp(Src) 36.6 °C (97.9 °F)  Ht 1.803 m (5' 11\")  Wt 97.977 kg (216 lb)  BMI 30.14 kg/m2  SpO2 99%     Physical Exam   Skin:   Healing laceration noted with central area of mild necrosis. No prrulence.               Assessment/Plan:     1. Laceration of index finger, subsequent encounter  RTC PRN. FU 4 days for possible suture removal.         "

## 2018-07-26 ENCOUNTER — OFFICE VISIT (OUTPATIENT)
Dept: URGENT CARE | Facility: PHYSICIAN GROUP | Age: 38
End: 2018-07-26
Payer: COMMERCIAL

## 2018-07-26 VITALS
WEIGHT: 205 LBS | OXYGEN SATURATION: 95 % | DIASTOLIC BLOOD PRESSURE: 76 MMHG | HEART RATE: 52 BPM | BODY MASS INDEX: 29.35 KG/M2 | RESPIRATION RATE: 14 BRPM | TEMPERATURE: 97 F | HEIGHT: 70 IN | SYSTOLIC BLOOD PRESSURE: 120 MMHG

## 2018-07-26 DIAGNOSIS — L03.213 PERIORBITAL CELLULITIS OF RIGHT EYE: ICD-10-CM

## 2018-07-26 PROCEDURE — 99214 OFFICE O/P EST MOD 30 MIN: CPT | Performed by: FAMILY MEDICINE

## 2018-07-26 RX ORDER — SULFAMETHOXAZOLE AND TRIMETHOPRIM 800; 160 MG/1; MG/1
1 TABLET ORAL 2 TIMES DAILY
Qty: 14 TAB | Refills: 0 | Status: SHIPPED | OUTPATIENT
Start: 2018-07-26 | End: 2018-08-02

## 2018-07-26 NOTE — PROGRESS NOTES
"SUBJECTIVE      Chief Complaint   Patient presents with   • Insect Bite     on r side of face swollen x2 days                Rash  This is a new problem. The problem has been gradually worsening since onset. Pain location:  Left side of face - possible spider bite 2 d ago. The rash is characterized by redness, swelling and pain.   Pain is constant, \"soreness\" and tender to touch.   Vision is unaffected.    Nothing makes the pain better or worse.   Pertinent negatives include no congestion, cough, fatigue, fever or shortness of breath. Past treatments include nothing.     History   Substance Use Topics   • Smoking status: Never Smoker    • Smokeless tobacco: No    • Alcohol Use: No      Past medical history was unremarkable and not pertinent to current issue      Family History   Problem Relation Age of Onset   • Hypertension Unknown    • Stroke Unknown          Review of Systems   Constitutional: Negative for fever and fatigue.   HENT: Negative for congestion.    Respiratory: Negative for cough and shortness of breath.    Cardiovascular: Negative for chest pain.   Gastrointestinal: Negative for abdominal pain.   Skin: Positive for rash.   Neurological: Negative for dizziness.   All other systems reviewed and are negative.         Objective:     Blood pressure 120/76, pulse (!) 52, temperature 36.1 °C (97 °F), resp. rate 14, height 1.778 m (5' 10\"), weight 93 kg (205 lb), SpO2 95 %.      Physical Exam   Constitutional: pt is oriented to person, place, and time. Pt appears well-developed and well-nourished. No distress.   HENT:   Head: Normocephalic and atraumatic.   Eyes: Conjunctivae are normal. No scleral icterus.  no d/c.   EOMI, WALDEMAR  Cardiovascular: Normal rate and regular rhythm.    Pulmonary/Chest: Effort normal and breath sounds normal. No respiratory distress.        Neurological: pt is alert and oriented to person, place, and time. No cranial nerve deficit.   Skin: Skin is warm. Pt is not diaphoretic.  "     Area of erythema, warmth, tender to touch just lateral to right orbit.   There is also a scabbed-over lesion over rt temple.    No induration or fluctuance.       Nursing note and vitals reviewed.              Assessment/Plan:     1. Periorbital cellulitis of right eye     - sulfamethoxazole-trimethoprim (BACTRIM DS) 800-160 MG tablet; Take 1 Tab by mouth 2 times a day for 7 days.  Dispense: 14 Tab; Refill: 0  - mupirocin (BACTROBAN) 2 % Ointment; Apply 1 Application to affected area(s) 2 times a day.  Dispense: 1 Tube; Refill: 0    Advised if no improvement in 24 hr to RTC for re-evaluation.

## 2018-07-27 ENCOUNTER — HOSPITAL ENCOUNTER (OUTPATIENT)
Dept: RADIOLOGY | Facility: MEDICAL CENTER | Age: 38
End: 2018-07-27
Attending: FAMILY MEDICINE
Payer: COMMERCIAL

## 2018-07-27 ENCOUNTER — OFFICE VISIT (OUTPATIENT)
Dept: URGENT CARE | Facility: PHYSICIAN GROUP | Age: 38
End: 2018-07-27
Payer: COMMERCIAL

## 2018-07-27 VITALS
SYSTOLIC BLOOD PRESSURE: 110 MMHG | HEIGHT: 71 IN | BODY MASS INDEX: 29.26 KG/M2 | TEMPERATURE: 98.8 F | DIASTOLIC BLOOD PRESSURE: 80 MMHG | OXYGEN SATURATION: 96 % | HEART RATE: 67 BPM | WEIGHT: 209 LBS

## 2018-07-27 DIAGNOSIS — L03.213 PERIORBITAL CELLULITIS OF RIGHT EYE: ICD-10-CM

## 2018-07-27 PROCEDURE — 70481 CT ORBIT/EAR/FOSSA W/DYE: CPT

## 2018-07-27 PROCEDURE — 99213 OFFICE O/P EST LOW 20 MIN: CPT | Performed by: FAMILY MEDICINE

## 2018-07-27 PROCEDURE — 700117 HCHG RX CONTRAST REV CODE 255: Performed by: FAMILY MEDICINE

## 2018-07-27 RX ADMIN — IOHEXOL 80 ML: 350 INJECTION, SOLUTION INTRAVENOUS at 16:40

## 2018-07-27 NOTE — PROGRESS NOTES
"   Chief Complaint   Patient presents with   • Spider Bite     x3 days/ pain/ ichy/ (R) side of head       F/u:      Periorbital cellulits - he is currently on day 1 of 7 of bactrim.   Denies side effects.   States rash is basically unchanged, but definitely no worse than before.   Denies fevers, vision changes or eye pain.                    History   Substance Use Topics   • Smoking status: Never Smoker    • Smokeless tobacco: No    • Alcohol Use: No       Past medical history was unremarkable and not pertinent to current issue        Family History         Family History   Problem Relation Age of Onset   • Hypertension Unknown     • Stroke Unknown                 Review of Systems   Constitutional: Negative for fever and fatigue.   HENT: Negative for congestion.    Respiratory: Negative for cough and shortness of breath.    Cardiovascular: Negative for chest pain.   Gastrointestinal: Negative for abdominal pain.   Skin: Positive for rash.   Neurological: Negative for dizziness.   All other systems reviewed and are negative.            Objective:      Blood pressure 110/80, pulse 67, temperature 37.1 °C (98.8 °F), height 1.803 m (5' 11\"), weight 94.8 kg (209 lb), SpO2 96 %.         Physical Exam   Constitutional: pt is oriented to person, place, and time. Pt appears well-developed and well-nourished. No distress.   HENT:   Head: Normocephalic and atraumatic.   Eyes: Conjunctivae are normal. No scleral icterus.  no d/c.   EOMI, WALDEMAR  Cardiovascular: Normal rate and regular rhythm.    Pulmonary/Chest: Effort normal and breath sounds normal. No respiratory distress.      Neurological: pt is alert and oriented to person, place, and time. No cranial nerve deficit.   Skin: Skin is warm. Pt is not diaphoretic.       Area of erythema, warmth, tender to touch just lateral to right orbit.  area of erythema appears slightly improved compared to yesterday, certainly no worse.  There is also a scabbed-over lesion over rt " temple.      No induration or fluctuance.         Nursing note and vitals reviewed.             7/27/2018 4:10 PM    HISTORY/REASON FOR EXAM:  Facial Pain.  Right periorbital soft tissue swelling    TECHNIQUE/EXAM DESCRIPTION AND NUMBER OF VIEWS:  CT Orbits with contrast, axial with coronal reconstructions.    The study was performed on a helical multidetector CT scanner. Contiguous thin section helical images were obtained of the orbits in axial plane. Coronal images were reconstructed from the helical data set. Images are reviewed at bone and soft tissue   windows.    80 mL of Omnipaque 350 nonionic contrast was injected intravenously.    Low dose optimization technique was utilized for this CT exam including automated exposure control and adjustment of the mA and/or kV according to patient size.    COMPARISON: None.    FINDINGS  There is right periorbital soft tissue swelling and enhancement. No abscess identified.  No postseptal fluid collections.  The bony orbits are intact. No fractures or bony deformities are present. The globes are unremarkable. There is no measurable proptosis. The extraocular muscles show normal configuration and symmetry. There are no intraconal or extraconal mass lesions or   abnormal enhancement. No abnormal fluid collections are evident.    The paranasal sinuses in the field of view are clear.   Impression       Right periorbital soft tissue swelling and enhancement consistent with inflammatory changes. No abscess.  No postseptal fluid collections.  Optic globes and nerves appear to be intact.  No paranasal sinus fluid collections.   Reading Provider Reading Date   Mic Lindsey M.D. Jul 27, 2018   Signing Provider Signing Date Signing Time   Mic Lindsey M.D. Jul 27, 2018  4:50 PM           Assessment/Plan:      1. Periorbital cellulitis of right eye         CT personally reviewed:  No evidence for orbital cellulitis    Will continue bactrim    F/u 3-4 days if no improvement, sooner if sx  worsen.

## 2019-01-11 ENCOUNTER — HOSPITAL ENCOUNTER (EMERGENCY)
Facility: MEDICAL CENTER | Age: 39
End: 2019-01-11
Attending: EMERGENCY MEDICINE
Payer: COMMERCIAL

## 2019-01-11 ENCOUNTER — APPOINTMENT (OUTPATIENT)
Dept: RADIOLOGY | Facility: MEDICAL CENTER | Age: 39
End: 2019-01-11
Attending: EMERGENCY MEDICINE
Payer: COMMERCIAL

## 2019-01-11 VITALS
WEIGHT: 210 LBS | HEART RATE: 95 BPM | DIASTOLIC BLOOD PRESSURE: 81 MMHG | OXYGEN SATURATION: 93 % | RESPIRATION RATE: 18 BRPM | BODY MASS INDEX: 29.29 KG/M2 | SYSTOLIC BLOOD PRESSURE: 151 MMHG | TEMPERATURE: 97.9 F

## 2019-01-11 DIAGNOSIS — Z00.8 MEDICAL CLEARANCE FOR INCARCERATION: ICD-10-CM

## 2019-01-11 DIAGNOSIS — D72.829 LEUKOCYTOSIS, UNSPECIFIED TYPE: ICD-10-CM

## 2019-01-11 DIAGNOSIS — R45.1 AGITATION: ICD-10-CM

## 2019-01-11 LAB
ALBUMIN SERPL BCP-MCNC: 5 G/DL (ref 3.2–4.9)
ALBUMIN/GLOB SERPL: 1.6 G/DL
ALP SERPL-CCNC: 50 U/L (ref 30–99)
ALT SERPL-CCNC: 31 U/L (ref 2–50)
ANION GAP SERPL CALC-SCNC: 12 MMOL/L (ref 0–11.9)
APAP SERPL-MCNC: <10 UG/ML (ref 10–30)
APPEARANCE UR: CLEAR
AST SERPL-CCNC: 30 U/L (ref 12–45)
BASOPHILS # BLD AUTO: 0.4 % (ref 0–1.8)
BASOPHILS # BLD AUTO: 0.4 % (ref 0–1.8)
BASOPHILS # BLD: 0.07 K/UL (ref 0–0.12)
BASOPHILS # BLD: 0.08 K/UL (ref 0–0.12)
BILIRUB SERPL-MCNC: 0.8 MG/DL (ref 0.1–1.5)
BILIRUB UR QL STRIP.AUTO: NEGATIVE
BUN SERPL-MCNC: 21 MG/DL (ref 8–22)
CALCIUM SERPL-MCNC: 10.1 MG/DL (ref 8.5–10.5)
CHLORIDE SERPL-SCNC: 107 MMOL/L (ref 96–112)
CK SERPL-CCNC: 646 U/L (ref 0–154)
CO2 SERPL-SCNC: 23 MMOL/L (ref 20–33)
COLOR UR: YELLOW
COMMENT 1642: NORMAL
CREAT SERPL-MCNC: 1.49 MG/DL (ref 0.5–1.4)
EOSINOPHIL # BLD AUTO: 0.03 K/UL (ref 0–0.51)
EOSINOPHIL # BLD AUTO: 0.07 K/UL (ref 0–0.51)
EOSINOPHIL NFR BLD: 0.2 % (ref 0–6.9)
EOSINOPHIL NFR BLD: 0.4 % (ref 0–6.9)
ERYTHROCYTE [DISTWIDTH] IN BLOOD BY AUTOMATED COUNT: 38.6 FL (ref 35.9–50)
ERYTHROCYTE [DISTWIDTH] IN BLOOD BY AUTOMATED COUNT: 41.3 FL (ref 35.9–50)
ETHANOL BLD-MCNC: 0 G/DL
GLOBULIN SER CALC-MCNC: 3.1 G/DL (ref 1.9–3.5)
GLUCOSE SERPL-MCNC: 98 MG/DL (ref 65–99)
GLUCOSE UR STRIP.AUTO-MCNC: NEGATIVE MG/DL
HCT VFR BLD AUTO: 49.9 % (ref 42–52)
HCT VFR BLD AUTO: 52.5 % (ref 42–52)
HGB BLD-MCNC: 17.5 G/DL (ref 14–18)
HGB BLD-MCNC: 17.9 G/DL (ref 14–18)
IMM GRANULOCYTES # BLD AUTO: 0.08 K/UL (ref 0–0.11)
IMM GRANULOCYTES # BLD AUTO: 0.09 K/UL (ref 0–0.11)
IMM GRANULOCYTES NFR BLD AUTO: 0.4 % (ref 0–0.9)
IMM GRANULOCYTES NFR BLD AUTO: 0.5 % (ref 0–0.9)
KETONES UR STRIP.AUTO-MCNC: 15 MG/DL
LACTATE BLD-SCNC: 1.4 MMOL/L (ref 0.5–2)
LEUKOCYTE ESTERASE UR QL STRIP.AUTO: NEGATIVE
LYMPHOCYTES # BLD AUTO: 1.42 K/UL (ref 1–4.8)
LYMPHOCYTES # BLD AUTO: 1.61 K/UL (ref 1–4.8)
LYMPHOCYTES NFR BLD: 7.4 % (ref 22–41)
LYMPHOCYTES NFR BLD: 8.4 % (ref 22–41)
MCH RBC QN AUTO: 30 PG (ref 27–33)
MCH RBC QN AUTO: 30.2 PG (ref 27–33)
MCHC RBC AUTO-ENTMCNC: 34.1 G/DL (ref 33.7–35.3)
MCHC RBC AUTO-ENTMCNC: 35.1 G/DL (ref 33.7–35.3)
MCV RBC AUTO: 85.4 FL (ref 81.4–97.8)
MCV RBC AUTO: 88.5 FL (ref 81.4–97.8)
MICRO URNS: ABNORMAL
MONOCYTES # BLD AUTO: 1.09 K/UL (ref 0–0.85)
MONOCYTES # BLD AUTO: 1.2 K/UL (ref 0–0.85)
MONOCYTES NFR BLD AUTO: 5.7 % (ref 0–13.4)
MONOCYTES NFR BLD AUTO: 6.2 % (ref 0–13.4)
MORPHOLOGY BLD-IMP: NORMAL
NEUTROPHILS # BLD AUTO: 16.14 K/UL (ref 1.82–7.42)
NEUTROPHILS # BLD AUTO: 16.45 K/UL (ref 1.82–7.42)
NEUTROPHILS NFR BLD: 84.6 % (ref 44–72)
NEUTROPHILS NFR BLD: 85.4 % (ref 44–72)
NITRITE UR QL STRIP.AUTO: NEGATIVE
NRBC # BLD AUTO: 0 K/UL
NRBC # BLD AUTO: 0 K/UL
NRBC BLD-RTO: 0 /100 WBC
NRBC BLD-RTO: 0 /100 WBC
PH UR STRIP.AUTO: 5.5 [PH]
PLATELET # BLD AUTO: 217 K/UL (ref 164–446)
PLATELET # BLD AUTO: 282 K/UL (ref 164–446)
PMV BLD AUTO: 10.4 FL (ref 9–12.9)
PMV BLD AUTO: 9.5 FL (ref 9–12.9)
POTASSIUM SERPL-SCNC: 3.8 MMOL/L (ref 3.6–5.5)
PROT SERPL-MCNC: 8.1 G/DL (ref 6–8.2)
PROT UR QL STRIP: NEGATIVE MG/DL
RBC # BLD AUTO: 5.84 M/UL (ref 4.7–6.1)
RBC # BLD AUTO: 5.93 M/UL (ref 4.7–6.1)
RBC UR QL AUTO: NEGATIVE
SALICYLATES SERPL-MCNC: 0 MG/DL (ref 15–25)
SODIUM SERPL-SCNC: 142 MMOL/L (ref 135–145)
SP GR UR STRIP.AUTO: 1.03
UROBILINOGEN UR STRIP.AUTO-MCNC: 0.2 MG/DL
WBC # BLD AUTO: 19.1 K/UL (ref 4.8–10.8)
WBC # BLD AUTO: 19.3 K/UL (ref 4.8–10.8)

## 2019-01-11 PROCEDURE — 87040 BLOOD CULTURE FOR BACTERIA: CPT

## 2019-01-11 PROCEDURE — 96375 TX/PRO/DX INJ NEW DRUG ADDON: CPT

## 2019-01-11 PROCEDURE — 96374 THER/PROPH/DIAG INJ IV PUSH: CPT

## 2019-01-11 PROCEDURE — 82550 ASSAY OF CK (CPK): CPT

## 2019-01-11 PROCEDURE — 700111 HCHG RX REV CODE 636 W/ 250 OVERRIDE (IP): Performed by: EMERGENCY MEDICINE

## 2019-01-11 PROCEDURE — 85025 COMPLETE CBC W/AUTO DIFF WBC: CPT

## 2019-01-11 PROCEDURE — 99285 EMERGENCY DEPT VISIT HI MDM: CPT

## 2019-01-11 PROCEDURE — 80053 COMPREHEN METABOLIC PANEL: CPT

## 2019-01-11 PROCEDURE — 81003 URINALYSIS AUTO W/O SCOPE: CPT

## 2019-01-11 PROCEDURE — 71045 X-RAY EXAM CHEST 1 VIEW: CPT

## 2019-01-11 PROCEDURE — 80307 DRUG TEST PRSMV CHEM ANLYZR: CPT

## 2019-01-11 PROCEDURE — 83605 ASSAY OF LACTIC ACID: CPT

## 2019-01-11 PROCEDURE — 700111 HCHG RX REV CODE 636 W/ 250 OVERRIDE (IP)

## 2019-01-11 RX ORDER — HALOPERIDOL 5 MG/ML
5 INJECTION INTRAMUSCULAR ONCE
Status: COMPLETED | OUTPATIENT
Start: 2019-01-11 | End: 2019-01-11

## 2019-01-11 RX ORDER — DIPHENHYDRAMINE HYDROCHLORIDE 50 MG/ML
INJECTION INTRAMUSCULAR; INTRAVENOUS
Status: COMPLETED
Start: 2019-01-11 | End: 2019-01-11

## 2019-01-11 RX ORDER — HALOPERIDOL 5 MG/ML
INJECTION INTRAMUSCULAR
Status: COMPLETED
Start: 2019-01-11 | End: 2019-01-11

## 2019-01-11 RX ORDER — DIPHENHYDRAMINE HYDROCHLORIDE 50 MG/ML
12.5 INJECTION INTRAMUSCULAR; INTRAVENOUS ONCE
Status: COMPLETED | OUTPATIENT
Start: 2019-01-11 | End: 2019-01-11

## 2019-01-11 RX ORDER — HALOPERIDOL 5 MG/1
5 TABLET ORAL ONCE
Status: DISCONTINUED | OUTPATIENT
Start: 2019-01-11 | End: 2019-01-12 | Stop reason: HOSPADM

## 2019-01-11 RX ORDER — LORAZEPAM 2 MG/ML
INJECTION INTRAMUSCULAR
Status: COMPLETED
Start: 2019-01-11 | End: 2019-01-11

## 2019-01-11 RX ORDER — LORAZEPAM 2 MG/ML
2 INJECTION INTRAMUSCULAR ONCE
Status: COMPLETED | OUTPATIENT
Start: 2019-01-11 | End: 2019-01-11

## 2019-01-11 RX ADMIN — HALOPERIDOL LACTATE 5 MG: 5 INJECTION, SOLUTION INTRAMUSCULAR at 18:30

## 2019-01-11 RX ADMIN — DIPHENHYDRAMINE HYDROCHLORIDE 12.5 MG: 50 INJECTION INTRAMUSCULAR; INTRAVENOUS at 18:30

## 2019-01-11 RX ADMIN — LORAZEPAM 2 MG: 2 INJECTION INTRAMUSCULAR; INTRAVENOUS at 18:30

## 2019-01-11 RX ADMIN — LORAZEPAM 2 MG: 2 INJECTION INTRAMUSCULAR at 18:30

## 2019-01-11 RX ADMIN — HALOPERIDOL LACTATE 5 MG: 5 INJECTION, SOLUTION INTRAMUSCULAR at 20:15
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
## 2019-01-12 NOTE — ED NOTES
Pt discharged to [facility], instructions given to PD regarding Pt care in their facility. IV removed, cathlon intact, site without s/s of infection.
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
## 2019-01-16 LAB
BACTERIA BLD CULT: NORMAL
BACTERIA BLD CULT: NORMAL
SIGNIFICANT IND 70042: NORMAL
SIGNIFICANT IND 70042: NORMAL
SITE SITE: NORMAL
SITE SITE: NORMAL
SOURCE SOURCE: NORMAL
SOURCE SOURCE: NORMAL

## 2019-09-24 ENCOUNTER — HOSPITAL ENCOUNTER (EMERGENCY)
Facility: MEDICAL CENTER | Age: 39
End: 2019-09-24
Attending: EMERGENCY MEDICINE
Payer: MEDICAID

## 2019-09-24 VITALS
BODY MASS INDEX: 27.16 KG/M2 | DIASTOLIC BLOOD PRESSURE: 72 MMHG | HEIGHT: 71 IN | SYSTOLIC BLOOD PRESSURE: 126 MMHG | HEART RATE: 70 BPM | WEIGHT: 194 LBS | TEMPERATURE: 98.4 F | OXYGEN SATURATION: 97 % | RESPIRATION RATE: 16 BRPM

## 2019-09-24 DIAGNOSIS — K13.79 MOUTH PAIN: ICD-10-CM

## 2019-09-24 DIAGNOSIS — K12.1 ORAL ULCER: ICD-10-CM

## 2019-09-24 PROCEDURE — 99283 EMERGENCY DEPT VISIT LOW MDM: CPT

## 2019-09-24 ASSESSMENT — LIFESTYLE VARIABLES: DO YOU DRINK ALCOHOL: NO

## 2019-09-24 ASSESSMENT — PAIN SCALES - WONG BAKER: WONGBAKER_NUMERICALRESPONSE: HURTS EVEN MORE
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
## 2019-09-24 NOTE — ED NOTES
"Pt ambulated steady to ED room blue 20. Chart readied, waiting for ERP to see. Pt noted to have large canker sore in lower front gum line. No swelling present, airway patent. Pt speaking in complete sentences, speech clear. Denies any other s/s. Pt states \"I think it happened when I ate hot pizza 2 days ago.\" NAD. VSS.   "

## 2019-09-24 NOTE — ED NOTES
Patient verbalized understanding of discharge instructions, provided with discharge paperwork, gait steady, ambulated independently to [facility].
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
## 2019-09-24 NOTE — DISCHARGE INSTRUCTIONS
Follow-up with primary care 1 to 2 days for reevaluation, wound check, medication management close blood pressure monitoring.  Referral for additional work-up or biopsy may be necessary if symptoms persist.    Magic mouthwash every 4-6 hours as needed for discomfort.  Use as directed.    Larue diet as tolerated.    Return to the emergency department for persistent or worsening oral ulcers, pain, difficulty swallowing or breathing, facial oral swelling, neck pain or stiffness, fever, change in voice or other new concerns.

## 2023-08-04 ENCOUNTER — HOSPITAL ENCOUNTER (EMERGENCY)
Facility: MEDICAL CENTER | Age: 43
End: 2023-08-04
Attending: EMERGENCY MEDICINE
Payer: MEDICAID

## 2023-08-04 ENCOUNTER — APPOINTMENT (OUTPATIENT)
Dept: RADIOLOGY | Facility: MEDICAL CENTER | Age: 43
End: 2023-08-04
Attending: EMERGENCY MEDICINE
Payer: MEDICAID

## 2023-08-04 VITALS
OXYGEN SATURATION: 94 % | WEIGHT: 220.24 LBS | DIASTOLIC BLOOD PRESSURE: 65 MMHG | HEIGHT: 71 IN | BODY MASS INDEX: 30.83 KG/M2 | SYSTOLIC BLOOD PRESSURE: 124 MMHG | RESPIRATION RATE: 16 BRPM | TEMPERATURE: 97.3 F | HEART RATE: 68 BPM

## 2023-08-04 DIAGNOSIS — S70.01XA CONTUSION OF RIGHT HIP, INITIAL ENCOUNTER: ICD-10-CM

## 2023-08-04 DIAGNOSIS — Z91.81 HISTORY OF RECENT FALL: ICD-10-CM

## 2023-08-04 PROCEDURE — 99284 EMERGENCY DEPT VISIT MOD MDM: CPT

## 2023-08-04 PROCEDURE — 73501 X-RAY EXAM HIP UNI 1 VIEW: CPT | Mod: RT

## 2023-08-04 PROCEDURE — 96372 THER/PROPH/DIAG INJ SC/IM: CPT

## 2023-08-04 PROCEDURE — 700111 HCHG RX REV CODE 636 W/ 250 OVERRIDE (IP): Mod: JZ,UD | Performed by: EMERGENCY MEDICINE

## 2023-08-04 RX ORDER — KETOROLAC TROMETHAMINE 30 MG/ML
60 INJECTION, SOLUTION INTRAMUSCULAR; INTRAVENOUS ONCE
Status: COMPLETED | OUTPATIENT
Start: 2023-08-04 | End: 2023-08-04

## 2023-08-04 RX ADMIN — KETOROLAC TROMETHAMINE 60 MG: 30 INJECTION, SOLUTION INTRAMUSCULAR; INTRAVENOUS at 04:55

## 2023-08-04 ASSESSMENT — PAIN DESCRIPTION - PAIN TYPE: TYPE: ACUTE PAIN

## 2023-08-04 NOTE — ED NOTES
Pt understands DC instructions, DC paperwork provided, work note provided per pt's request, pt ambulated with steady gait to JULIANNA moreland for DC

## 2023-08-04 NOTE — ED TRIAGE NOTES
Vipul Esparza  42 y.o.  Male  Chief Complaint   Patient presents with    Hip Pain     C/o right hip pain s/p GLF 5 days ago. Hx of right hip fracture. Ambulatory in triage.

## 2023-08-04 NOTE — ED PROVIDER NOTES
ED Provider Note    CHIEF COMPLAINT  Chief Complaint   Patient presents with    Hip Pain     C/o right hip pain s/p GLF 5 days ago. Hx of right hip fracture. Ambulatory in triage.       EXTERNAL RECORDS REVIEWED  Other no pertinent medical records    HPI/ROS  LIMITATION TO HISTORY   Select: : None  OUTSIDE HISTORIAN(S):  none    Vipul Esparza is a 42 y.o. male who presents tonight with a chief complaint of right hip pain after he apparently fell on it 5 days ago.  He has a history of right hip fracture and he is worried he may have injured it again.  He has been walking on it but states that it was hurting while he was at work yesterday and he had to go home.  He is currently living at the shelter.    PAST MEDICAL HISTORY   has a past medical history of ASTHMA, Dental disorder, Diabetes, Pain, Psychiatric problem, and Seizure (HCC) (9/2010).    SURGICAL HISTORY   has a past surgical history that includes mandible fracture orif (10/7/2010).    FAMILY HISTORY  Family History   Problem Relation Age of Onset    Hypertension Other     Stroke Other        SOCIAL HISTORY  Social History     Tobacco Use    Smoking status: Former     Packs/day: 1.00     Years: 5.00     Pack years: 5.00     Types: Cigarettes    Smokeless tobacco: Never   Vaping Use    Vaping Use: Every day    Substances: Nicotine   Substance and Sexual Activity    Alcohol use: No    Drug use: Yes     Comment: marijuana    Sexual activity: Not on file       CURRENT MEDICATIONS  Home Medications       Reviewed by Thom Mello R.N. (Registered Nurse) on 08/04/23 at 0355  Med List Status: Partial     Medication Last Dose Status   buPROPion (WELLBUTRIN) 100 MG Tab  Active   divalproex (DEPAKOTE) 500 MG TBEC  Active   hydroxyzine (VISTARIL) 50 MG CAPS  Active   maalox plus-benadryl-visc lidocaine (MAGIC MOUTHWASH)  Active   mupirocin (BACTROBAN) 2 % Ointment  Active   olanzapine (ZYPREXA) 10 MG tablet  Active                    ALLERGIES  No Known Allergies    PHYSICAL  "EXAM  VITAL SIGNS: /65   Pulse 68   Temp 36.3 °C (97.3 °F) (Temporal)   Resp 16   Ht 1.803 m (5' 11\")   Wt 99.9 kg (220 lb 3.8 oz)   SpO2 94%   BMI 30.72 kg/m²    Constitutional: Patient is well developed, well nourished. Non-toxic appearing. No acute distress.   HENT: Normocephalic, atraumatic. Oropharynx moist without erythema or exudates.  Eyes: PERRL, EOMI  Neck: Supple   Lymphatic: No lymphadenopathy noted.   Cardiovascular: Normal heart rate and Regular rhythm. No murmur  Thorax & Lungs: Clear and equal breath sounds with good excursion. No respiratory distress  Abdomen: Bowel sounds normal in all four quadrants. Soft,nontender  Skin: Warm, Dry, No contusions or abrasions.  Back: No cervical, thoracic, or lumbosacral tenderness.    Extremities: Peripheral pulses 4/4 tenderness on palpation of the right hip, no contusions noted.  Musculoskeletal: Normal range of motion in all major joints. No tenderness to palpation or major deformities noted.   Neurologic: Alert & oriented x 3, Normal motor function, Normal sensory function  Psychiatric: Affect odd.     DIAGNOSTIC STUDIES / PROCEDURES    RADIOLOGY  I have independently interpreted the diagnostic imaging associated with this visit and am waiting the final reading from the radiologist.   My preliminary interpretation is as follows: No fracture or dislocation.  Radiologist interpretation:   DX-HIP-UNILATERAL-WITH PELVIS-1 VIEW RIGHT   Final Result      No acute process.           COURSE & MEDICAL DECISION MAKING    ED Observation Status? No; Patient does not meet criteria for ED Observation.     INITIAL ASSESSMENT, COURSE AND PLAN  Care Narrative: X-ray performed of the right hip reveals no fracture or dislocation.  Patient was given Toradol 60 mg IM for his pain.  He is to apply topical Biofreeze, take ibuprofen, moist heat and rest for 1 day.  He is discharged in stable and improved condition.        ADDITIONAL PROBLEM LIST  Schizoaffective " disorder  DISPOSITION AND DISCUSSIONS  I have discussed management of the patient with the following physicians and PB's: None    Discussion of management with other [facility] or appropriate source(s): None     Barriers to care at this time, including but not limited to: Patient does not have established PCP, Patient lacks financial resources, and patient is living in the shelter. .     Decision tools and prescription drugs considered including, but not limited to: Ibuprofen, topical Biofreeze.    FINAL DIAGNOSIS  1. History of recent fall    2. Contusion of right hip, initial encounter           Electronically signed by: Hailey Phillips D.O., 8/4/2023 4:51 AM

## 2023-08-04 NOTE — DISCHARGE INSTRUCTIONS
Apply warm moist compresses or hot tub soaks in Epsom salts  Take ibuprofen every 8 hours with food as needed for pain  Try topical Biofreeze and then the moist heat.  Follow-up with your primary care provider in 1 week for recheck.

## 2023-12-10 ENCOUNTER — HOSPITAL ENCOUNTER (EMERGENCY)
Facility: MEDICAL CENTER | Age: 43
End: 2023-12-10
Attending: EMERGENCY MEDICINE
Payer: MEDICAID

## 2023-12-10 ENCOUNTER — HOSPITAL ENCOUNTER (EMERGENCY)
Facility: MEDICAL CENTER | Age: 43
End: 2023-12-11
Attending: EMERGENCY MEDICINE
Payer: MEDICAID

## 2023-12-10 VITALS
HEART RATE: 99 BPM | OXYGEN SATURATION: 91 % | WEIGHT: 180 LBS | RESPIRATION RATE: 16 BRPM | DIASTOLIC BLOOD PRESSURE: 71 MMHG | HEIGHT: 70 IN | BODY MASS INDEX: 25.77 KG/M2 | SYSTOLIC BLOOD PRESSURE: 115 MMHG | TEMPERATURE: 99.1 F

## 2023-12-10 DIAGNOSIS — R68.89 FLU-LIKE SYMPTOMS: ICD-10-CM

## 2023-12-10 DIAGNOSIS — R10.13 EPIGASTRIC PAIN: Primary | ICD-10-CM

## 2023-12-10 PROCEDURE — 99285 EMERGENCY DEPT VISIT HI MDM: CPT

## 2023-12-10 PROCEDURE — 302449 STATCHG TRIAGE ONLY (STATISTIC)

## 2023-12-10 ASSESSMENT — PAIN DESCRIPTION - PAIN TYPE: TYPE: ACUTE PAIN

## 2023-12-11 VITALS
HEART RATE: 79 BPM | RESPIRATION RATE: 17 BRPM | BODY MASS INDEX: 31.5 KG/M2 | WEIGHT: 220 LBS | DIASTOLIC BLOOD PRESSURE: 69 MMHG | HEIGHT: 70 IN | SYSTOLIC BLOOD PRESSURE: 118 MMHG | OXYGEN SATURATION: 92 % | TEMPERATURE: 97.6 F

## 2023-12-11 LAB
ALBUMIN SERPL BCP-MCNC: 4 G/DL (ref 3.2–4.9)
ALBUMIN/GLOB SERPL: 1.6 G/DL
ALP SERPL-CCNC: 60 U/L (ref 30–99)
ALT SERPL-CCNC: 17 U/L (ref 2–50)
ANION GAP SERPL CALC-SCNC: 12 MMOL/L (ref 7–16)
APPEARANCE UR: CLEAR
AST SERPL-CCNC: 13 U/L (ref 12–45)
BASOPHILS # BLD AUTO: 0.2 % (ref 0–1.8)
BASOPHILS # BLD: 0.02 K/UL (ref 0–0.12)
BILIRUB SERPL-MCNC: 0.9 MG/DL (ref 0.1–1.5)
BILIRUB UR QL STRIP.AUTO: NEGATIVE
BUN SERPL-MCNC: 14 MG/DL (ref 8–22)
CALCIUM ALBUM COR SERPL-MCNC: 8.2 MG/DL (ref 8.5–10.5)
CALCIUM SERPL-MCNC: 8.2 MG/DL (ref 8.5–10.5)
CHLORIDE SERPL-SCNC: 102 MMOL/L (ref 96–112)
CO2 SERPL-SCNC: 22 MMOL/L (ref 20–33)
COLOR UR: ABNORMAL
CREAT SERPL-MCNC: 0.98 MG/DL (ref 0.5–1.4)
EOSINOPHIL # BLD AUTO: 0.05 K/UL (ref 0–0.51)
EOSINOPHIL NFR BLD: 0.6 % (ref 0–6.9)
ERYTHROCYTE [DISTWIDTH] IN BLOOD BY AUTOMATED COUNT: 41.1 FL (ref 35.9–50)
FLUAV RNA SPEC QL NAA+PROBE: NEGATIVE
FLUBV RNA SPEC QL NAA+PROBE: NEGATIVE
GFR SERPLBLD CREATININE-BSD FMLA CKD-EPI: 99 ML/MIN/1.73 M 2
GLOBULIN SER CALC-MCNC: 2.5 G/DL (ref 1.9–3.5)
GLUCOSE SERPL-MCNC: 107 MG/DL (ref 65–99)
GLUCOSE UR STRIP.AUTO-MCNC: NEGATIVE MG/DL
HCT VFR BLD AUTO: 43.8 % (ref 42–52)
HGB BLD-MCNC: 14.9 G/DL (ref 14–18)
IMM GRANULOCYTES # BLD AUTO: 0.03 K/UL (ref 0–0.11)
IMM GRANULOCYTES NFR BLD AUTO: 0.3 % (ref 0–0.9)
KETONES UR STRIP.AUTO-MCNC: ABNORMAL MG/DL
LEUKOCYTE ESTERASE UR QL STRIP.AUTO: NEGATIVE
LIPASE SERPL-CCNC: 17 U/L (ref 11–82)
LYMPHOCYTES # BLD AUTO: 0.78 K/UL (ref 1–4.8)
LYMPHOCYTES NFR BLD: 9 % (ref 22–41)
MCH RBC QN AUTO: 29.5 PG (ref 27–33)
MCHC RBC AUTO-ENTMCNC: 34 G/DL (ref 32.3–36.5)
MCV RBC AUTO: 86.7 FL (ref 81.4–97.8)
MICRO URNS: ABNORMAL
MONOCYTES # BLD AUTO: 0.66 K/UL (ref 0–0.85)
MONOCYTES NFR BLD AUTO: 7.6 % (ref 0–13.4)
NEUTROPHILS # BLD AUTO: 7.16 K/UL (ref 1.82–7.42)
NEUTROPHILS NFR BLD: 82.3 % (ref 44–72)
NITRITE UR QL STRIP.AUTO: NEGATIVE
NRBC # BLD AUTO: 0 K/UL
NRBC BLD-RTO: 0 /100 WBC (ref 0–0.2)
PH UR STRIP.AUTO: 5.5 [PH] (ref 5–8)
PLATELET # BLD AUTO: 250 K/UL (ref 164–446)
PMV BLD AUTO: 9.1 FL (ref 9–12.9)
POTASSIUM SERPL-SCNC: 3.5 MMOL/L (ref 3.6–5.5)
PROT SERPL-MCNC: 6.5 G/DL (ref 6–8.2)
PROT UR QL STRIP: NEGATIVE MG/DL
RBC # BLD AUTO: 5.05 M/UL (ref 4.7–6.1)
RBC UR QL AUTO: NEGATIVE
RSV RNA SPEC QL NAA+PROBE: NEGATIVE
SARS-COV-2 RNA RESP QL NAA+PROBE: NOTDETECTED
SODIUM SERPL-SCNC: 136 MMOL/L (ref 135–145)
SP GR UR STRIP.AUTO: 1.03
SPECIMEN SOURCE: NORMAL
UROBILINOGEN UR STRIP.AUTO-MCNC: 0.2 MG/DL
WBC # BLD AUTO: 8.7 K/UL (ref 4.8–10.8)

## 2023-12-11 PROCEDURE — 80053 COMPREHEN METABOLIC PANEL: CPT

## 2023-12-11 PROCEDURE — 700111 HCHG RX REV CODE 636 W/ 250 OVERRIDE (IP): Mod: JZ,UD | Performed by: EMERGENCY MEDICINE

## 2023-12-11 PROCEDURE — 96374 THER/PROPH/DIAG INJ IV PUSH: CPT

## 2023-12-11 PROCEDURE — 0241U HCHG SARS-COV-2 COVID-19 NFCT DS RESP RNA 4 TRGT MIC: CPT

## 2023-12-11 PROCEDURE — 96375 TX/PRO/DX INJ NEW DRUG ADDON: CPT

## 2023-12-11 PROCEDURE — 36415 COLL VENOUS BLD VENIPUNCTURE: CPT

## 2023-12-11 PROCEDURE — 700105 HCHG RX REV CODE 258: Mod: UD | Performed by: EMERGENCY MEDICINE

## 2023-12-11 PROCEDURE — 85025 COMPLETE CBC W/AUTO DIFF WBC: CPT

## 2023-12-11 PROCEDURE — 81003 URINALYSIS AUTO W/O SCOPE: CPT

## 2023-12-11 PROCEDURE — 83690 ASSAY OF LIPASE: CPT

## 2023-12-11 PROCEDURE — C9803 HOPD COVID-19 SPEC COLLECT: HCPCS | Performed by: EMERGENCY MEDICINE

## 2023-12-11 RX ORDER — ACETAMINOPHEN 500 MG
1000 TABLET ORAL EVERY 6 HOURS PRN
Qty: 20 TABLET | Refills: 0 | Status: SHIPPED | OUTPATIENT
Start: 2023-12-11 | End: 2023-12-15

## 2023-12-11 RX ORDER — MORPHINE SULFATE 4 MG/ML
4 INJECTION INTRAVENOUS ONCE
Status: COMPLETED | OUTPATIENT
Start: 2023-12-11 | End: 2023-12-11

## 2023-12-11 RX ORDER — SODIUM CHLORIDE 9 MG/ML
1000 INJECTION, SOLUTION INTRAVENOUS ONCE
Status: COMPLETED | OUTPATIENT
Start: 2023-12-11 | End: 2023-12-11

## 2023-12-11 RX ORDER — IBUPROFEN 800 MG/1
800 TABLET ORAL EVERY 8 HOURS PRN
Qty: 9 TABLET | Refills: 0 | Status: SHIPPED | OUTPATIENT
Start: 2023-12-11 | End: 2023-12-14

## 2023-12-11 RX ORDER — ONDANSETRON 2 MG/ML
4 INJECTION INTRAMUSCULAR; INTRAVENOUS ONCE
Status: COMPLETED | OUTPATIENT
Start: 2023-12-11 | End: 2023-12-11

## 2023-12-11 RX ADMIN — ONDANSETRON 4 MG: 2 INJECTION INTRAMUSCULAR; INTRAVENOUS at 00:29

## 2023-12-11 RX ADMIN — SODIUM CHLORIDE 1000 ML: 9 INJECTION, SOLUTION INTRAVENOUS at 00:26

## 2023-12-11 RX ADMIN — MORPHINE SULFATE 4 MG: 4 INJECTION, SOLUTION INTRAMUSCULAR; INTRAVENOUS at 00:29

## 2023-12-11 ASSESSMENT — PAIN DESCRIPTION - PAIN TYPE: TYPE: ACUTE PAIN

## 2023-12-11 NOTE — ED NOTES
Hollie brought to room via wheelchair, gowned, and placed on monitors (BP, Pulse Ox). Patient is Low fall risk. Standard fall risk precautions in place including bed in lowest position, side rails up, call light within reach, and area free of clutter. Patient stable on RA. Chart up for ERP.

## 2023-12-11 NOTE — ED NOTES
Pt discharged, all appropriate hospital equipment removed (IV, monitor, pulse ox, etc.). Pt left unit via walking with stable gait to ED lobby for transport home. Personal belongings with pt when leaving unit. Pt given discharge instructions prior to leaving unit including where to  prescriptions and when to follow-up; verbalizes understanding. Pt informed to return to ED if symptoms worsen/return or altered status develop. Copy of discharge instructions signed and turned into DC basket and copy sent with pt.

## 2023-12-11 NOTE — ED TRIAGE NOTES
"Chief Complaint   Patient presents with    Abdominal Pain     PT STATES ABD PAIN AND FLU LIKE SYMPTOMS FOR A FEW DAYS. EMS GAVE 4 ZOFRAN, TYLENOL, AND MOTRIN. GCS 15.            PT WHEELED TO TRIAGE. Pt AA&O X 4, SEE ABOVE. PT ARRIVED EMS FROM [facility].     PT TO LAB/LOBBY . PT EDUCATED ON ALERTING STAFF TO CHANGES IN CONDITION. PT VERBALIZED AN UNDERSTANDING.     Ht 1.778 m (5' 10\")   Wt 99.8 kg (220 lb)   BMI 31.57 kg/m²       "

## 2023-12-11 NOTE — ED NOTES
LABS WERE SENT ON PT IN TRIAGE UNDER OTHER REGISTERED NAME. RN TO CALL LAB AND SEE IF RESULTS CAN BE TRANSFERRED OR IF NEW LABS NEED TO BE SENT.

## 2023-12-11 NOTE — ED TRIAGE NOTES
"Chief Complaint   Patient presents with    Abdominal Pain     PT STATES ABD PAIN WITH FLU LIKE SYMPTOMS FOR A FEW DAYS. EMS GAVE 4 ZOFRAN, TYLENOL, AND MOTRIN. GCS 15.            PT WHEELED TO TRIAGE. Pt AA&O X 4, SEE ABOVE.     PT TO LAB/LOBBY . PT EDUCATED ON ALERTING STAFF TO CHANGES IN CONDITION. PT VERBALIZED AN UNDERSTANDING.     /71   Pulse 99   Temp 37.3 °C (99.1 °F) (Temporal)   Resp 16   Ht 1.778 m (5' 10\")   Wt 81.6 kg (180 lb)   SpO2 91%   BMI 25.83 kg/m²     "

## 2023-12-11 NOTE — ED PROVIDER NOTES
ED Provider Note    Scribed for Ernie Walker by Marie Ricci. 12/10/2023  11:46 PM    Primary care provider: Pcp Pt States None  Means of arrival: EMS  History obtained from: Patient  History limited by: None    CHIEF COMPLAINT  Chief Complaint   Patient presents with    Abdominal Pain     PT STATES ABD PAIN AND FLU LIKE SYMPTOMS FOR A FEW DAYS. EMS GAVE 4 ZOFRAN, TYLENOL, AND MOTRIN. GCS 15.      EXTERNAL RECORDS REVIEWED  Outpatient Notes Office visit 9/29/22 shows the patient to have a history of diabetes. Seizures, psychiatric illness, and methamphetamine abuse.     HPI/ROS  LIMITATION TO HISTORY   Select: : None  OUTSIDE HISTORIAN(S):  None    HPI  Vipul Esparza is a 43 y.o. male who presents to the Emergency Department via EMS for abdominal pain onset a few days ago. The patient reports that for the past few days he has been having abdominal pain along with various flu like symptoms. He further describes that last week he was sick with some type of cold, and was primarily coughing up a lot of mucus. He denies taking a flu or covid test. He notes that yesterday he felt improved, however today it returned worse in severity. He notes EMS reported his temperature as 100. He adds feeling very weak and nauseous. He denies diarrhea or vomiting. He describes that his abdominal pain feels like its being pulled on. He notes that EMS gave him 4 mg of Zofran en route, along with Tylenol and Motrin. He adds that he is currently recovering from methamphetamine abuse, and uses every 7-10 days because of hard craving. He notes living at the homeless shelter currently. He adds having concerns of not eating well. He adds he currently works. He notes also having a pounding headache, however the medication he was given has not alleviated this. There are no known alleviating or exacerbating factors.        REVIEW OF SYSTEMS  As above, all other systems reviewed and are negative.   See HPI for further details.     PAST MEDICAL  "HISTORY   has a past medical history of ASTHMA, Dental disorder, Diabetes, Pain, Psychiatric problem, and Seizure ([facility]) (9/2010).  SURGICAL HISTORY   has a past surgical history that includes mandible fracture orif (10/7/2010).  SOCIAL HISTORY  Social History     Tobacco Use    Smoking status: Former     Current packs/day: 1.00     Average packs/day: 1 pack/day for 5.0 years (5.0 ttl pk-yrs)     Types: Cigarettes    Smokeless tobacco: Never   Vaping Use    Vaping Use: Every day    Substances: Nicotine   Substance Use Topics    Alcohol use: No    Drug use: Yes     Comment: marijuana      Social History     Substance and Sexual Activity   Drug Use Yes    Comment: marijuana     FAMILY HISTORY  Family History   Problem Relation Age of Onset    Hypertension Other     Stroke Other      CURRENT MEDICATIONS  Home Medications    **Home medications have not yet been reviewed for this encounter**       ALLERGIES  No Known Allergies    PHYSICAL EXAM    VITAL SIGNS:   Vitals:    12/10/23 2306 12/10/23 2349 12/11/23 0006 12/11/23 0201   BP: 115/71 107/55 109/58 118/69   Pulse: 99 92 87 79   Resp: 16   17   Temp: 37.3 °C (99.1 °F)   36.4 °C (97.6 °F)   TempSrc: Temporal   Temporal   SpO2: 91% 89% 91% 92%   Weight:  99.8 kg (220 lb)     Height:  1.778 m (5' 10\")         Vitals: My interpretation: normotensive, not tachycardic, afebrile, not hypoxic    Reinterpretation of vitals: unchanged, unremarkable    PE:   Gen: sitting comfortably, speaking clearly, appears in no acute distress   ENT: Mucous membranes moist, posterior pharynx clear, uvula midline, nares patent bilaterally   Neck: Supple, FROM  Pulmonary: Lungs are clear to auscultation bilaterally. No tachypnea  CV:  RRR, no murmur appreciated, pulses 2+ in both upper and lower extremities  Abdomen: soft, NT/ND; no rebound/guarding  : no CVA or suprapubic tenderness   Neuro: A&Ox4 (person, place, time, situation), speech fluent, gait steady, no focal deficits " appreciated  Skin: No rash or lesions.  No pallor or jaundice.  No cyanosis.  Warm and dry.     DIAGNOSTIC STUDIES / PROCEDURES    LABS  Results for orders placed or performed during the hospital encounter of 12/10/23   CBC WITH DIFFERENTIAL   Result Value Ref Range    WBC 8.7 4.8 - 10.8 K/uL    RBC 5.05 4.70 - 6.10 M/uL    Hemoglobin 14.9 14.0 - 18.0 g/dL    Hematocrit 43.8 42.0 - 52.0 %    MCV 86.7 81.4 - 97.8 fL    MCH 29.5 27.0 - 33.0 pg    MCHC 34.0 32.3 - 36.5 g/dL    RDW 41.1 35.9 - 50.0 fL    Platelet Count 250 164 - 446 K/uL    MPV 9.1 9.0 - 12.9 fL    Neutrophils-Polys 82.30 (H) 44.00 - 72.00 %    Lymphocytes 9.00 (L) 22.00 - 41.00 %    Monocytes 7.60 0.00 - 13.40 %    Eosinophils 0.60 0.00 - 6.90 %    Basophils 0.20 0.00 - 1.80 %    Immature Granulocytes 0.30 0.00 - 0.90 %    Nucleated RBC 0.00 0.00 - 0.20 /100 WBC    Neutrophils (Absolute) 7.16 1.82 - 7.42 K/uL    Lymphs (Absolute) 0.78 (L) 1.00 - 4.80 K/uL    Monos (Absolute) 0.66 0.00 - 0.85 K/uL    Eos (Absolute) 0.05 0.00 - 0.51 K/uL    Baso (Absolute) 0.02 0.00 - 0.12 K/uL    Immature Granulocytes (abs) 0.03 0.00 - 0.11 K/uL    NRBC (Absolute) 0.00 K/uL   COMP METABOLIC PANEL   Result Value Ref Range    Sodium 136 135 - 145 mmol/L    Potassium 3.5 (L) 3.6 - 5.5 mmol/L    Chloride 102 96 - 112 mmol/L    Co2 22 20 - 33 mmol/L    Anion Gap 12.0 7.0 - 16.0    Glucose 107 (H) 65 - 99 mg/dL    Bun 14 8 - 22 mg/dL    Creatinine 0.98 0.50 - 1.40 mg/dL    Calcium 8.2 (L) 8.5 - 10.5 mg/dL    Correct Calcium 8.2 (L) 8.5 - 10.5 mg/dL    AST(SGOT) 13 12 - 45 U/L    ALT(SGPT) 17 2 - 50 U/L    Alkaline Phosphatase 60 30 - 99 U/L    Total Bilirubin 0.9 0.1 - 1.5 mg/dL    Albumin 4.0 3.2 - 4.9 g/dL    Total Protein 6.5 6.0 - 8.2 g/dL    Globulin 2.5 1.9 - 3.5 g/dL    A-G Ratio 1.6 g/dL   LIPASE   Result Value Ref Range    Lipase 17 11 - 82 U/L   URINALYSIS CULTURE, IF INDICATED    Specimen: Urine, Clean Catch   Result Value Ref Range    Color DK Yellow     Character  Clear     Specific Gravity 1.034 <1.035    Ph 5.5 5.0 - 8.0    Glucose Negative Negative mg/dL    Ketones Trace (A) Negative mg/dL    Protein Negative Negative mg/dL    Bilirubin Negative Negative    Urobilinogen, Urine 0.2 Negative    Nitrite Negative Negative    Leukocyte Esterase Negative Negative    Occult Blood Negative Negative    Micro Urine Req see below    ESTIMATED GFR   Result Value Ref Range    GFR (CKD-EPI) 99 >60 mL/min/1.73 m 2   CoV-2, Flu A/B, And RSV by PCR ([employer])    Specimen: Respirate   Result Value Ref Range    Influenza virus A RNA Negative Negative    Influenza virus B, PCR Negative Negative    RSV, PCR Negative Negative    SARS-CoV-2 by PCR NotDetected     SARS-CoV-2 Source NP Swab       All labs reviewed by me. Labs were compared to prior labs if they were available. Significant for no leukocytosis, no anemia, normal electrolytes, normal glucose, normal renal function, normal liver enzymes, normal bilirubin, lipase normal, urinalysis negative for infection or blood, Viral panel shows COVID negative, Influenza A/B negative, and RSV negative.     COURSE & MEDICAL DECISION MAKING  Nursing notes, VS, PMSFHx, labs, imaging, EKG reviewed in chart.    ED Observation Status? No; Patient does not meet criteria for ED Observation.     Ddx: Homelessness, flu, COVID, RSV    MDM: 11:46 PM Vipul Esparza is a 43 y.o. male who presented with evaluation of abdominal pain in the setting of flulike symptoms for the past week.  Patient is currently homeless living at the [facility].  States that last week he developed general URI symptoms that had improved until yesterday and today when they worsened again.  He is a poor historian has difficulty describing his discomfort but ultimately has nausea and mild tenderness in the epigastric region.  He states he is in a chronic relapsing dutta with methamphetamine addiction, usually goes about a week or 2 clean and then relapses.  Unknown last use of  methamphetamine.  Upon arrival here his vital signs are thankfully unremarkable, afebrile, not hypoxic and normotensive.  His exam is very benign with clear lungs and soft abdomen.  Will initiate workup here with IV fluids as the patient feels dehydrated and complains of subjective fevers and chills, IV pain and nausea medications and laboratory evaluation as well as a flu, COVID and RSV swab.  All labs reviewed by me. Labs were compared to prior labs if they were available. Significant for no leukocytosis, no anemia, normal electrolytes, normal glucose, normal renal function, normal liver enzymes, normal bilirubin, lipase normal, urinalysis negative for infection or blood, Viral panel shows COVID negative, Influenza A/B negative, and RSV negative.  Overall patient has had reassuring evaluation today, vital signs remain normal, and considering his negative workup here, he is appropriate for discharge with symptomatic treatment.  Sent prescriptions for Tylenol, Motrin and Zofran to help with symptomatology.  Return precautions were discussed.  He verbalized understanding and is amenable.    HYDRATION: Based on the patient's presentation of Dehydration the patient was given IV fluids. IV Hydration was used because oral hydration was not adequate alone. Upon recheck following hydration, the patient was improved.     ADDITIONAL PROBLEM LIST AND DISPOSITION    I have discussed management of the patient with the following physicians and PB's:  None    Discussion of management with other [facility] or appropriate source(s): None     Escalation of care considered, and ultimately not performed:the patient was evaluated by myself, after discussion I have recommended the patient to be discharged    Barriers to care at this time, including but not limited to: Patient does not have established PCP and Patient is homeless.     Decision tools and prescription drugs considered including, but not limited to: Pain Medications morphine 4 mg  .    FINAL IMPRESSION  1. Epigastric pain Acute   2. Flu-like symptoms Acute       Marie VILLASENOR (Scribe), am scribing for, and in the presence of, Ernie Walker.    Electronically signed by: Marie Ricci (Scribe), 12/10/2023    IErnie personally performed the services described in this documentation, as scribed by Marie Ricci in my presence, and it is both accurate and complete.    The note accurately reflects work and decisions made by me.  Ernie Walker  12/11/2023  12:31 AM

## 2023-12-11 NOTE — DISCHARGE INSTRUCTIONS
It is unclear was causing her symptoms tonight.  Probably a viral illness.  But your blood work, vital signs are all very reassuring.  I want you to follow-up with your primary care team for further evaluation and treatment.  If you have any worsening symptoms or concerns please return to the ED.  Thank you for coming in today.

## 2023-12-11 NOTE — ED NOTES
Hollie brought to room via wheelchair due to pain, gowned, and placed on monitors (BP, Pulse Ox). Patient is low fall risk. Standard fall risk precautions in place including bed in lowest position, side rails up, call light within reach, and area free of clutter. Patient stable on RA. Chart up for ERP.

## 2023-12-19 ENCOUNTER — TELEPHONE (OUTPATIENT)
Dept: HEALTH INFORMATION MANAGEMENT | Facility: OTHER | Age: 43
End: 2023-12-19
Payer: MEDICAID

## 2024-07-21 ENCOUNTER — HOSPITAL ENCOUNTER (EMERGENCY)
Facility: MEDICAL CENTER | Age: 44
End: 2024-07-21
Attending: EMERGENCY MEDICINE

## 2024-07-21 VITALS
HEIGHT: 71 IN | SYSTOLIC BLOOD PRESSURE: 138 MMHG | OXYGEN SATURATION: 98 % | HEART RATE: 66 BPM | WEIGHT: 222.66 LBS | RESPIRATION RATE: 16 BRPM | TEMPERATURE: 96.5 F | BODY MASS INDEX: 31.17 KG/M2 | DIASTOLIC BLOOD PRESSURE: 77 MMHG

## 2024-07-21 DIAGNOSIS — H92.01 RIGHT EAR PAIN: ICD-10-CM

## 2024-07-21 PROCEDURE — 99282 EMERGENCY DEPT VISIT SF MDM: CPT

## 2024-07-21 ASSESSMENT — PAIN DESCRIPTION - PAIN TYPE: TYPE: ACUTE PAIN

## 2024-07-21 ASSESSMENT — FIBROSIS 4 INDEX: FIB4 SCORE: 0.53
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
## 2024-09-16 ENCOUNTER — APPOINTMENT (OUTPATIENT)
Dept: RADIOLOGY | Facility: MEDICAL CENTER | Age: 44
End: 2024-09-16
Attending: EMERGENCY MEDICINE
Payer: MEDICAID

## 2024-09-16 ENCOUNTER — HOSPITAL ENCOUNTER (EMERGENCY)
Facility: MEDICAL CENTER | Age: 44
End: 2024-09-16
Attending: EMERGENCY MEDICINE
Payer: MEDICAID

## 2024-09-16 VITALS
RESPIRATION RATE: 15 BRPM | TEMPERATURE: 97.4 F | BODY MASS INDEX: 32.48 KG/M2 | DIASTOLIC BLOOD PRESSURE: 77 MMHG | WEIGHT: 232 LBS | HEIGHT: 71 IN | OXYGEN SATURATION: 96 % | SYSTOLIC BLOOD PRESSURE: 128 MMHG | HEART RATE: 65 BPM

## 2024-09-16 DIAGNOSIS — S92.355A NONDISPLACED FRACTURE OF FIFTH METATARSAL BONE, LEFT FOOT, INITIAL ENCOUNTER FOR CLOSED FRACTURE: ICD-10-CM

## 2024-09-16 DIAGNOSIS — J06.9 UPPER RESPIRATORY TRACT INFECTION, UNSPECIFIED TYPE: ICD-10-CM

## 2024-09-16 LAB
FLUAV RNA SPEC QL NAA+PROBE: NEGATIVE
FLUBV RNA SPEC QL NAA+PROBE: NEGATIVE
RSV RNA SPEC QL NAA+PROBE: NEGATIVE
SARS-COV-2 RNA RESP QL NAA+PROBE: NOTDETECTED

## 2024-09-16 PROCEDURE — 700102 HCHG RX REV CODE 250 W/ 637 OVERRIDE(OP): Performed by: EMERGENCY MEDICINE

## 2024-09-16 PROCEDURE — 73610 X-RAY EXAM OF ANKLE: CPT | Mod: LT

## 2024-09-16 PROCEDURE — 73630 X-RAY EXAM OF FOOT: CPT | Mod: LT

## 2024-09-16 PROCEDURE — 99284 EMERGENCY DEPT VISIT MOD MDM: CPT

## 2024-09-16 PROCEDURE — 0241U HCHG SARS-COV-2 COVID-19 NFCT DS RESP RNA 4 TRGT ED POC: CPT

## 2024-09-16 PROCEDURE — A9270 NON-COVERED ITEM OR SERVICE: HCPCS | Performed by: EMERGENCY MEDICINE

## 2024-09-16 PROCEDURE — 71045 X-RAY EXAM CHEST 1 VIEW: CPT

## 2024-09-16 RX ORDER — ACETAMINOPHEN 325 MG/1
650 TABLET ORAL ONCE
Status: COMPLETED | OUTPATIENT
Start: 2024-09-16 | End: 2024-09-16

## 2024-09-16 RX ADMIN — ACETAMINOPHEN 650 MG: 325 TABLET ORAL at 07:23

## 2024-09-16 ASSESSMENT — PAIN DESCRIPTION - PAIN TYPE: TYPE: ACUTE PAIN

## 2024-09-16 ASSESSMENT — FIBROSIS 4 INDEX: FIB4 SCORE: 0.54

## 2024-09-16 NOTE — ED NOTES
Pt discharged home as ordered by erp. Pt instructed to follow up with [facility] and return here as need. . Pt verbalized understanding and left ambulating independently. Pt given MTM information

## 2024-09-16 NOTE — ED PROVIDER NOTES
"ED PHYSICIAN NOTE    CHIEF COMPLAINT  Chief Complaint   Patient presents with    Ankle Pain     Left ankle rolled and twisted   Flue like symptoms       HPI/ROS      Vipul Esparza is a 44 y.o. male who presents with 2 complaints.      First complaint of left ankle pain.  Patient reports last night he was walking and stepped on a curb awkwardly twisted his ankle.  Describes forceful inversion.  Has pain over the ankle diffusely as well as pain over the foot.  Difficulty with ambulation.  No numbness tingling weakness.  No other trauma.    Patient second complaint is of cough, congestion runny nose.  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  He thinks he was exposed to mice urine and feces.  He is worried about having hantavirus.  He says he started with cough and runny nose.  It was worse when he was at the the home working.  Thought he was getting better but symptoms seem to worsen.  He denies a fever, chills, body aches.  No shortness of breath.  Denies abdominal pain nausea vomiting diarrhea.    PAST MEDICAL HISTORY  Past Medical History:   Diagnosis Date    ASTHMA     Dental disorder     broken teeth lower front    Diabetes     possible    Pain     jaw    Psychiatric problem     anxiety    Seizure (HCC) 9/2010    \"during sleep\"       SOCIAL HISTORY  Social History     Tobacco Use    Smoking status: Former     Current packs/day: 1.00     Average packs/day: 1 pack/day for 5.0 years (5.0 ttl pk-yrs)     Types: Cigarettes    Smokeless tobacco: Never   Vaping Use    Vaping status: Every Day    Substances: Nicotine   Substance Use Topics    Alcohol use: No    Drug use: Not Currently     Comment: marijuana       CURRENT MEDICATIONS  Home Medications       Reviewed by Maria E Hawthorne R.N. (Registered Nurse) on 09/16/24 at 0653  Med List Status: Not Addressed     Medication Last Dose Status   buPROPion (WELLBUTRIN) 100 MG Tab  Active   divalproex (DEPAKOTE) 500 MG TBEC  Active   hydroxyzine (VISTARIL) 50 " "MG CAPS  Active   maalox plus-benadryl-visc lidocaine (MAGIC MOUTHWASH)  Active   mupirocin (BACTROBAN) 2 % Ointment  Active   olanzapine (ZYPREXA) 10 MG tablet  Active                  Audit from Redirected Encounters    **Home medications have not yet been reviewed for this encounter**         ALLERGIES  No Known Allergies    PHYSICAL EXAM  VITAL SIGNS: /77   Pulse 69   Temp 36.6 °C (97.8 °F) (Temporal)   Resp 18   Ht 1.803 m (5' 11\")   Wt 105 kg (232 lb)   SpO2 95%   BMI 32.36 kg/m²    Constitutional: Awake and alert  HENT: Clear rhinorrhea.  Pharynx normal.  Eyes: Normal inspection  Neck: Grossly normal range of motion.  Cardiovascular: Normal heart rate, Normal rhythm.  Symmetric peripheral pulses.   Thorax & Lungs: No respiratory distress, No wheezing, No rales, No rhonchi, No chest tenderness.   Abdomen: Bowel sounds normal, soft, non-distended, nontender, no mass  Skin: No obvious rash.  Back: No tenderness, No CVA tenderness.   Extremities: swelling and tenderness diffusely about the left ankle.  Swelling over the lateral left foot.  Neurologic: Grossly normal       DIAGNOSTIC STUDIES / PROCEDURES  LABS/EKG       RADIOLOGY  I have independently interpreted the diagnostic imaging associated with this visit and am waiting the final reading from the radiologist.   My preliminary interpretation is as follows: Chest x-ray negative.  Left ankle x-ray negative.  Left foot x-ray with fifth metatarsal fracture    COURSE & MEDICAL DECISION MAKING    INITIAL ASSESSMENT, COURSE AND PLAN  Care Narrative: Patient presents with left foot and ankle pain after trauma.  Secondarily complains of URI symptoms.  He was given Tylenol for discomfort.  Ordered x-rays.    Chest x-ray is negative.  Patient was concerned about hantavirus.  This seems unlikely with negative x-ray and normal vital signs reassuring.  Was identified to have fifth metatarsal fracture.  He is placed in a walking boot and crutches to use as " needed.  Advised Tylenol and/or ibuprofen.  He should follow-up with orthopedics for evaluation of fracture.  He should return to the ER for difficulty breathing, worsening, not improving or concern        Interventions  Medications   acetaminophen (Tylenol) tablet 650 mg (650 mg Oral Given 9/16/24 0723)       DISPOSITION AND DISCUSSIONS    Escalation of care considered, and ultimately not performed: Considered blood work but patient is clinically nontoxic with stable vital signs.  Not indicated      Prescription drugs considered and/or prescribed:   Considered opiate prescription, but nonnarcotic analgesic is most appropriate      Follow up  [facility]  [address]  [facility] [id number] [phone]    Dr. Villa    FINAL IMPRESSION  1.  Left fifth metatarsal fracture  2.  Viral upper respiratory infection    This dictation was created using voice recognition software. The accuracy of the dictation is limited to the abilities of the software. I expect there may be some errors of grammar and possibly content. The nursing notes were reviewed and certain aspects of this information were incorporated into this note.    Electronically signed by: Justino Rhoades M.D., 9/16/2024

## 2024-09-16 NOTE — ED TRIAGE NOTES
"Chief Complaint   Patient presents with    Ankle Pain     Left ankle rolled and twisted   Flue like symptoms     Pt BIBA from [facility] for the above mentioned complaints. Pt stated he was walking outside and roll his left ankle last night. Pt complaining of severe left ankle pain now, denies any head strike. Pt also reported having flu like symptoms today, sore throat, cough, runny nose and sinus congestion.     /75   Pulse 70   Temp 36.6 °C (97.8 °F) (Temporal)   Resp 18   Ht 1.803 m (5' 11\")   Wt 105 kg (232 lb)   SpO2 93%   BMI 32.36 kg/m²     "